# Patient Record
Sex: FEMALE | Race: WHITE | NOT HISPANIC OR LATINO | Employment: FULL TIME | URBAN - METROPOLITAN AREA
[De-identification: names, ages, dates, MRNs, and addresses within clinical notes are randomized per-mention and may not be internally consistent; named-entity substitution may affect disease eponyms.]

---

## 2023-02-11 PROBLEM — M19.91 PRIMARY OSTEOARTHRITIS: Status: ACTIVE | Noted: 2023-02-11

## 2023-02-11 PROBLEM — E55.9 VITAMIN D DEFICIENCY: Status: ACTIVE | Noted: 2023-02-11

## 2023-02-11 PROBLEM — K21.00 GASTROESOPHAGEAL REFLUX DISEASE WITH ESOPHAGITIS WITHOUT HEMORRHAGE: Status: ACTIVE | Noted: 2023-02-11

## 2023-02-11 PROBLEM — R73.09 ABNORMAL GLUCOSE: Status: ACTIVE | Noted: 2023-02-11

## 2023-02-11 PROBLEM — H61.21 CERUMEN DEBRIS ON TYMPANIC MEMBRANE OF RIGHT EAR: Status: ACTIVE | Noted: 2023-02-11

## 2023-02-11 PROBLEM — M19.012 ARTHRITIS OF LEFT SHOULDER REGION: Status: ACTIVE | Noted: 2023-02-11

## 2023-02-11 PROBLEM — Z78.0 MENOPAUSE: Status: ACTIVE | Noted: 2023-02-11

## 2023-02-11 PROBLEM — F33.9 CHRONIC MAJOR DEPRESSIVE DISORDER, RECURRENT EPISODE (CMS-HCC): Status: ACTIVE | Noted: 2023-02-11

## 2023-02-11 PROBLEM — E78.2 MIXED HYPERLIPIDEMIA: Status: ACTIVE | Noted: 2023-02-11

## 2023-02-11 PROBLEM — H61.23 BILATERAL IMPACTED CERUMEN: Status: ACTIVE | Noted: 2023-02-11

## 2023-02-11 PROBLEM — M85.80 OSTEOPENIA: Status: ACTIVE | Noted: 2023-02-11

## 2023-02-11 RX ORDER — ATORVASTATIN CALCIUM 40 MG/1
TABLET, FILM COATED ORAL
COMMUNITY
Start: 2013-08-26 | End: 2023-06-20 | Stop reason: SDUPTHER

## 2023-02-11 RX ORDER — PANTOPRAZOLE SODIUM 40 MG/1
TABLET, DELAYED RELEASE ORAL
COMMUNITY
Start: 2022-05-19 | End: 2023-03-27 | Stop reason: SDUPTHER

## 2023-02-11 RX ORDER — CITALOPRAM 40 MG/1
TABLET, FILM COATED ORAL
COMMUNITY
Start: 2013-08-26 | End: 2023-03-27 | Stop reason: SDUPTHER

## 2023-02-11 RX ORDER — CELECOXIB 200 MG/1
CAPSULE ORAL
COMMUNITY
Start: 2013-09-19 | End: 2023-03-27 | Stop reason: SDUPTHER

## 2023-02-11 RX ORDER — LEVOTHYROXINE SODIUM 25 UG/1
TABLET ORAL
COMMUNITY
End: 2023-04-20 | Stop reason: SDUPTHER

## 2023-03-16 ENCOUNTER — TELEPHONE (OUTPATIENT)
Dept: PRIMARY CARE | Facility: CLINIC | Age: 68
End: 2023-03-16
Payer: COMMERCIAL

## 2023-03-27 ENCOUNTER — OFFICE VISIT (OUTPATIENT)
Dept: PRIMARY CARE | Facility: CLINIC | Age: 68
End: 2023-03-27
Payer: COMMERCIAL

## 2023-03-27 VITALS
DIASTOLIC BLOOD PRESSURE: 68 MMHG | RESPIRATION RATE: 16 BRPM | HEIGHT: 67 IN | HEART RATE: 59 BPM | WEIGHT: 167 LBS | OXYGEN SATURATION: 99 % | BODY MASS INDEX: 26.21 KG/M2 | TEMPERATURE: 97.2 F | SYSTOLIC BLOOD PRESSURE: 110 MMHG

## 2023-03-27 DIAGNOSIS — E53.8 VITAMIN B 12 DEFICIENCY: ICD-10-CM

## 2023-03-27 DIAGNOSIS — E78.2 MIXED HYPERLIPIDEMIA: ICD-10-CM

## 2023-03-27 DIAGNOSIS — K21.00 GASTROESOPHAGEAL REFLUX DISEASE WITH ESOPHAGITIS WITHOUT HEMORRHAGE: ICD-10-CM

## 2023-03-27 DIAGNOSIS — Z12.12 ENCOUNTER FOR SCREENING FOR COLORECTAL MALIGNANT NEOPLASM: ICD-10-CM

## 2023-03-27 DIAGNOSIS — R73.09 ABNORMAL GLUCOSE: ICD-10-CM

## 2023-03-27 DIAGNOSIS — M19.012 ARTHRITIS OF LEFT SHOULDER REGION: ICD-10-CM

## 2023-03-27 DIAGNOSIS — E55.9 VITAMIN D DEFICIENCY: ICD-10-CM

## 2023-03-27 DIAGNOSIS — Z12.11 ENCOUNTER FOR SCREENING FOR COLORECTAL MALIGNANT NEOPLASM: ICD-10-CM

## 2023-03-27 DIAGNOSIS — M15.9 PRIMARY OSTEOARTHRITIS INVOLVING MULTIPLE JOINTS: ICD-10-CM

## 2023-03-27 DIAGNOSIS — F33.9 CHRONIC MAJOR DEPRESSIVE DISORDER, RECURRENT EPISODE (CMS-HCC): Primary | ICD-10-CM

## 2023-03-27 DIAGNOSIS — Z12.11 ENCOUNTER FOR SCREENING FOR MALIGNANT NEOPLASM OF COLON: ICD-10-CM

## 2023-03-27 DIAGNOSIS — R79.9 ABNORMAL FINDING OF BLOOD CHEMISTRY, UNSPECIFIED: ICD-10-CM

## 2023-03-27 DIAGNOSIS — M85.80 OSTEOPENIA, UNSPECIFIED LOCATION: ICD-10-CM

## 2023-03-27 DIAGNOSIS — H61.21 IMPACTED CERUMEN, RIGHT EAR: ICD-10-CM

## 2023-03-27 DIAGNOSIS — H61.20 WAX IN EAR: ICD-10-CM

## 2023-03-27 DIAGNOSIS — Z00.00 ROUTINE GENERAL MEDICAL EXAMINATION AT A HEALTH CARE FACILITY: ICD-10-CM

## 2023-03-27 PROBLEM — Z78.0 ENCOUNTER FOR OSTEOPOROSIS SCREENING IN ASYMPTOMATIC POSTMENOPAUSAL PATIENT: Status: ACTIVE | Noted: 2023-03-27

## 2023-03-27 PROBLEM — Z13.820 ENCOUNTER FOR OSTEOPOROSIS SCREENING IN ASYMPTOMATIC POSTMENOPAUSAL PATIENT: Status: ACTIVE | Noted: 2023-03-27

## 2023-03-27 LAB — POC HEMOGLOBIN A1C: 5.9 % (ref 4.2–6.5)

## 2023-03-27 PROCEDURE — 1036F TOBACCO NON-USER: CPT | Performed by: FAMILY MEDICINE

## 2023-03-27 PROCEDURE — 99214 OFFICE O/P EST MOD 30 MIN: CPT | Performed by: FAMILY MEDICINE

## 2023-03-27 PROCEDURE — 1159F MED LIST DOCD IN RCRD: CPT | Performed by: FAMILY MEDICINE

## 2023-03-27 PROCEDURE — 1160F RVW MEDS BY RX/DR IN RCRD: CPT | Performed by: FAMILY MEDICINE

## 2023-03-27 PROCEDURE — 83036 HEMOGLOBIN GLYCOSYLATED A1C: CPT | Performed by: FAMILY MEDICINE

## 2023-03-27 RX ORDER — CITALOPRAM 40 MG/1
40 TABLET, FILM COATED ORAL DAILY
Qty: 90 TABLET | Refills: 1 | Status: SHIPPED
Start: 2023-03-27 | End: 2023-06-20 | Stop reason: DRUGHIGH

## 2023-03-27 RX ORDER — VIT C/E/ZN/COPPR/LUTEIN/ZEAXAN 250MG-90MG
1000 CAPSULE ORAL
COMMUNITY

## 2023-03-27 RX ORDER — PANTOPRAZOLE SODIUM 40 MG/1
40 TABLET, DELAYED RELEASE ORAL
Qty: 180 TABLET | Refills: 0 | Status: SHIPPED | OUTPATIENT
Start: 2023-03-27 | End: 2023-06-20 | Stop reason: SDUPTHER

## 2023-03-27 RX ORDER — CELECOXIB 200 MG/1
200 CAPSULE ORAL 2 TIMES DAILY
Qty: 180 CAPSULE | Refills: 1 | Status: SHIPPED | OUTPATIENT
Start: 2023-03-27 | End: 2023-06-20 | Stop reason: SDUPTHER

## 2023-03-27 RX ORDER — CHOLECALCIFEROL (VITAMIN D3) 25 MCG
TABLET,CHEWABLE ORAL EVERY 24 HOURS
COMMUNITY

## 2023-03-27 ASSESSMENT — ENCOUNTER SYMPTOMS
CHILLS: 0
NERVOUS/ANXIOUS: 0
FACIAL SWELLING: 0
LOSS OF SENSATION IN FEET: 0
APPETITE CHANGE: 0
NAUSEA: 0
SLEEP DISTURBANCE: 0
WOUND: 0
ADENOPATHY: 0
NECK STIFFNESS: 0
DYSURIA: 0
VOMITING: 0
CONSTIPATION: 0
WHEEZING: 0
POLYDIPSIA: 0
JOINT SWELLING: 0
FEVER: 0
COLOR CHANGE: 0
SINUS PRESSURE: 0
UNEXPECTED WEIGHT CHANGE: 0
ARTHRALGIAS: 0
BACK PAIN: 0
POLYPHAGIA: 0
SHORTNESS OF BREATH: 0
TROUBLE SWALLOWING: 0
PALPITATIONS: 0
MYALGIAS: 0
OCCASIONAL FEELINGS OF UNSTEADINESS: 0
EYE ITCHING: 0
CHEST TIGHTNESS: 0
BRUISES/BLEEDS EASILY: 0
BLOOD IN STOOL: 0
EYE PAIN: 0
AGITATION: 0
RHINORRHEA: 0
SINUS PAIN: 0
VOICE CHANGE: 0
SORE THROAT: 0
COUGH: 0
ABDOMINAL PAIN: 0
FATIGUE: 0
EYE DISCHARGE: 0
DIZZINESS: 0
DIAPHORESIS: 0
DIARRHEA: 0
HEMATURIA: 0
ACTIVITY CHANGE: 0
FREQUENCY: 0
FLANK PAIN: 0
DEPRESSION: 0
EYE REDNESS: 0

## 2023-03-27 ASSESSMENT — PATIENT HEALTH QUESTIONNAIRE - PHQ9
2. FEELING DOWN, DEPRESSED OR HOPELESS: NOT AT ALL
1. LITTLE INTEREST OR PLEASURE IN DOING THINGS: NOT AT ALL
SUM OF ALL RESPONSES TO PHQ9 QUESTIONS 1 AND 2: 0

## 2023-03-27 NOTE — PROGRESS NOTES
"Subjective   Patient ID: Tammy Marks is a 68 y.o. female who presents for 6 month follow up  and Hypothyroidism (Needs to have A1c in office today).  Today she is accompanied by alone.     HPI    Review of Systems   Constitutional:  Negative for activity change, appetite change, chills, diaphoresis, fatigue, fever and unexpected weight change.   HENT:  Negative for congestion, dental problem, drooling, ear discharge, ear pain, facial swelling, hearing loss, nosebleeds, postnasal drip, rhinorrhea, sinus pressure, sinus pain, sneezing, sore throat, tinnitus, trouble swallowing and voice change.    Eyes:  Negative for pain, discharge, redness, itching and visual disturbance.   Respiratory:  Negative for cough, chest tightness, shortness of breath and wheezing.    Cardiovascular:  Negative for chest pain, palpitations and leg swelling.   Gastrointestinal:  Negative for abdominal pain, blood in stool, constipation, diarrhea, nausea and vomiting.   Endocrine: Negative for cold intolerance, heat intolerance, polydipsia, polyphagia and polyuria.   Genitourinary:  Negative for decreased urine volume, dysuria, flank pain, frequency, hematuria and urgency.   Musculoskeletal:  Negative for arthralgias, back pain, gait problem, joint swelling, myalgias and neck stiffness.   Skin:  Negative for color change, pallor, rash and wound.   Neurological:  Negative for dizziness.   Hematological:  Negative for adenopathy. Does not bruise/bleed easily.   Psychiatric/Behavioral:  Negative for agitation, behavioral problems and sleep disturbance. The patient is not nervous/anxious.        Objective   Pulse 59   Temp 36.2 °C (97.2 °F)   Resp 16   Ht 1.702 m (5' 7\")   Wt 75.8 kg (167 lb)   SpO2 99%   BMI 26.16 kg/m²   BSA: 1.89 meters squared  Growth percentiles: Facility age limit for growth %angel is 20 years. Facility age limit for growth %angel is 20 years.     Physical Exam  Vitals and nursing note reviewed.   Constitutional:     "   General: She is not in acute distress.     Appearance: Normal appearance. She is normal weight. She is not ill-appearing or toxic-appearing.   HENT:      Head: Normocephalic.      Right Ear: Tympanic membrane, ear canal and external ear normal. There is impacted cerumen.      Left Ear: Tympanic membrane, ear canal and external ear normal. There is no impacted cerumen.      Nose: Nose normal. No congestion or rhinorrhea.      Mouth/Throat:      Mouth: Mucous membranes are moist.      Pharynx: Oropharynx is clear. No oropharyngeal exudate or posterior oropharyngeal erythema.   Eyes:      General: No scleral icterus.        Right eye: No discharge.         Left eye: No discharge.      Extraocular Movements: Extraocular movements intact.      Conjunctiva/sclera: Conjunctivae normal.      Pupils: Pupils are equal, round, and reactive to light.   Neck:      Vascular: No carotid bruit.   Cardiovascular:      Rate and Rhythm: Normal rate and regular rhythm.      Pulses: Normal pulses.      Heart sounds: Normal heart sounds. No murmur heard.     No gallop.   Pulmonary:      Effort: No respiratory distress.      Breath sounds: No wheezing or rhonchi.   Chest:      Chest wall: No tenderness.   Abdominal:      General: Abdomen is flat. Bowel sounds are normal.      Palpations: Abdomen is soft. There is no mass.      Tenderness: There is no abdominal tenderness. There is no guarding or rebound.      Hernia: No hernia is present.   Musculoskeletal:         General: No swelling or tenderness. Normal range of motion.      Cervical back: Normal range of motion. No rigidity or tenderness.      Right lower leg: No edema.      Left lower leg: No edema.   Lymphadenopathy:      Cervical: No cervical adenopathy.   Skin:     General: Skin is warm and dry.      Coloration: Skin is not pale.      Findings: No bruising, erythema or rash.   Neurological:      General: No focal deficit present.      Mental Status: She is alert and oriented to  person, place, and time.      Sensory: No sensory deficit.      Coordination: Coordination normal.      Gait: Gait normal.      Deep Tendon Reflexes: Reflexes normal.   Psychiatric:         Mood and Affect: Mood normal.         Behavior: Behavior normal.         Thought Content: Thought content normal.         Judgment: Judgment normal.         Assessment/Plan   Problem List Items Addressed This Visit       Abnormal glucose    Relevant Orders    POCT glycosylated hemoglobin (Hb A1C) manually resulted    Arthritis of left shoulder region    Chronic major depressive disorder, recurrent episode (CMS/HCC) - Primary    Relevant Medications    citalopram (CeleXA) 40 mg tablet    Other Relevant Orders    TSH with reflex to Free T4 if abnormal    Gastroesophageal reflux disease with esophagitis without hemorrhage    Relevant Medications    pantoprazole (ProtoNix) 40 mg EC tablet    Mixed hyperlipidemia    Relevant Orders    Comprehensive Metabolic Panel    Osteopenia    Relevant Medications    celecoxib (CeleBREX) 200 mg capsule    Primary osteoarthritis    Vitamin D deficiency    Vitamin B 12 deficiency    Relevant Orders    Vitamin B12     Other Visit Diagnoses       Routine general medical examination at a health care facility        Relevant Orders    Lipid Panel    CBC and Auto Differential    Follow Up In [facility] - PCP    Encounter for screening for colorectal malignant neoplasm        Relevant Orders    POCT Fecal occult immunoassay-ifob manually resulted    Encounter for screening for malignant neoplasm of colon        Relevant Orders    POCT Fecal occult immunoassay-ifob manually resulted    Abnormal finding of blood chemistry, unspecified        Relevant Orders    CBC and Auto Differential    Wax in ear        Relevant Orders    Ear Cerumen Removal    Impacted cerumen, right ear        Relevant Orders    Ear Cerumen Removal

## 2023-04-20 DIAGNOSIS — R79.9 ABNORMAL FINDING OF BLOOD CHEMISTRY, UNSPECIFIED: ICD-10-CM

## 2023-04-20 RX ORDER — LEVOTHYROXINE SODIUM 25 UG/1
25 TABLET ORAL
Qty: 90 TABLET | Refills: 1 | Status: SHIPPED | OUTPATIENT
Start: 2023-04-20 | End: 2023-06-20 | Stop reason: SDUPTHER

## 2023-05-23 ENCOUNTER — LAB (OUTPATIENT)
Dept: LAB | Facility: LAB | Age: 68
End: 2023-05-23
Payer: COMMERCIAL

## 2023-05-23 DIAGNOSIS — R79.9 ABNORMAL FINDING OF BLOOD CHEMISTRY, UNSPECIFIED: ICD-10-CM

## 2023-05-23 DIAGNOSIS — E78.2 MIXED HYPERLIPIDEMIA: ICD-10-CM

## 2023-05-23 DIAGNOSIS — Z00.00 ROUTINE GENERAL MEDICAL EXAMINATION AT A HEALTH CARE FACILITY: ICD-10-CM

## 2023-05-23 DIAGNOSIS — F33.9 CHRONIC MAJOR DEPRESSIVE DISORDER, RECURRENT EPISODE (CMS-HCC): ICD-10-CM

## 2023-05-23 DIAGNOSIS — E53.8 VITAMIN B 12 DEFICIENCY: ICD-10-CM

## 2023-05-23 LAB
ALANINE AMINOTRANSFERASE (SGPT) (U/L) IN SER/PLAS: 22 U/L (ref 7–45)
ALBUMIN (G/DL) IN SER/PLAS: 4.3 G/DL (ref 3.4–5)
ALKALINE PHOSPHATASE (U/L) IN SER/PLAS: 93 U/L (ref 33–136)
ANION GAP IN SER/PLAS: 9 MMOL/L (ref 10–20)
ASPARTATE AMINOTRANSFERASE (SGOT) (U/L) IN SER/PLAS: 18 U/L (ref 9–39)
BASOPHILS (10*3/UL) IN BLOOD BY AUTOMATED COUNT: 0.03 X10E9/L (ref 0–0.1)
BASOPHILS/100 LEUKOCYTES IN BLOOD BY AUTOMATED COUNT: 0.6 % (ref 0–2)
BILIRUBIN TOTAL (MG/DL) IN SER/PLAS: 0.6 MG/DL (ref 0–1.2)
CALCIUM (MG/DL) IN SER/PLAS: 9.4 MG/DL (ref 8.6–10.6)
CARBON DIOXIDE, TOTAL (MMOL/L) IN SER/PLAS: 32 MMOL/L (ref 21–32)
CHLORIDE (MMOL/L) IN SER/PLAS: 107 MMOL/L (ref 98–107)
CHOLESTEROL (MG/DL) IN SER/PLAS: 193 MG/DL (ref 0–199)
CHOLESTEROL IN HDL (MG/DL) IN SER/PLAS: 66.2 MG/DL
CHOLESTEROL/HDL RATIO: 2.9
COBALAMIN (VITAMIN B12) (PG/ML) IN SER/PLAS: 929 PG/ML (ref 211–911)
CREATININE (MG/DL) IN SER/PLAS: 0.67 MG/DL (ref 0.5–1.05)
EOSINOPHILS (10*3/UL) IN BLOOD BY AUTOMATED COUNT: 0.16 X10E9/L (ref 0–0.7)
EOSINOPHILS/100 LEUKOCYTES IN BLOOD BY AUTOMATED COUNT: 3.4 % (ref 0–6)
ERYTHROCYTE DISTRIBUTION WIDTH (RATIO) BY AUTOMATED COUNT: 12.7 % (ref 11.5–14.5)
ERYTHROCYTE MEAN CORPUSCULAR HEMOGLOBIN CONCENTRATION (G/DL) BY AUTOMATED: 32.7 G/DL (ref 32–36)
ERYTHROCYTE MEAN CORPUSCULAR VOLUME (FL) BY AUTOMATED COUNT: 100 FL (ref 80–100)
ERYTHROCYTES (10*6/UL) IN BLOOD BY AUTOMATED COUNT: 4.27 X10E12/L (ref 4–5.2)
GFR FEMALE: >90 ML/MIN/1.73M2
GLUCOSE (MG/DL) IN SER/PLAS: 105 MG/DL (ref 74–99)
HEMATOCRIT (%) IN BLOOD BY AUTOMATED COUNT: 42.8 % (ref 36–46)
HEMOGLOBIN (G/DL) IN BLOOD: 14 G/DL (ref 12–16)
IMMATURE GRANULOCYTES/100 LEUKOCYTES IN BLOOD BY AUTOMATED COUNT: 0.4 % (ref 0–0.9)
LDL: 100 MG/DL (ref 0–99)
LEUKOCYTES (10*3/UL) IN BLOOD BY AUTOMATED COUNT: 4.7 X10E9/L (ref 4.4–11.3)
LYMPHOCYTES (10*3/UL) IN BLOOD BY AUTOMATED COUNT: 1.6 X10E9/L (ref 1.2–4.8)
LYMPHOCYTES/100 LEUKOCYTES IN BLOOD BY AUTOMATED COUNT: 34 % (ref 13–44)
MONOCYTES (10*3/UL) IN BLOOD BY AUTOMATED COUNT: 0.35 X10E9/L (ref 0.1–1)
MONOCYTES/100 LEUKOCYTES IN BLOOD BY AUTOMATED COUNT: 7.4 % (ref 2–10)
NEUTROPHILS (10*3/UL) IN BLOOD BY AUTOMATED COUNT: 2.54 X10E9/L (ref 1.2–7.7)
NEUTROPHILS/100 LEUKOCYTES IN BLOOD BY AUTOMATED COUNT: 54.2 % (ref 40–80)
NRBC (PER 100 WBCS) BY AUTOMATED COUNT: 0 /100 WBC (ref 0–0)
PLATELETS (10*3/UL) IN BLOOD AUTOMATED COUNT: 250 X10E9/L (ref 150–450)
POTASSIUM (MMOL/L) IN SER/PLAS: 4.4 MMOL/L (ref 3.5–5.3)
PROTEIN TOTAL: 6.4 G/DL (ref 6.4–8.2)
SODIUM (MMOL/L) IN SER/PLAS: 144 MMOL/L (ref 136–145)
THYROTROPIN (MIU/L) IN SER/PLAS BY DETECTION LIMIT <= 0.05 MIU/L: 2.86 MIU/L (ref 0.44–3.98)
TRIGLYCERIDE (MG/DL) IN SER/PLAS: 134 MG/DL (ref 0–149)
UREA NITROGEN (MG/DL) IN SER/PLAS: 15 MG/DL (ref 6–23)
VLDL: 27 MG/DL (ref 0–40)

## 2023-05-23 PROCEDURE — 82607 VITAMIN B-12: CPT

## 2023-05-23 PROCEDURE — 85025 COMPLETE CBC W/AUTO DIFF WBC: CPT

## 2023-05-23 PROCEDURE — 80053 COMPREHEN METABOLIC PANEL: CPT

## 2023-05-23 PROCEDURE — 84443 ASSAY THYROID STIM HORMONE: CPT

## 2023-05-23 PROCEDURE — 80061 LIPID PANEL: CPT

## 2023-05-23 PROCEDURE — 36415 COLL VENOUS BLD VENIPUNCTURE: CPT

## 2023-06-20 ENCOUNTER — OFFICE VISIT (OUTPATIENT)
Dept: PRIMARY CARE | Facility: CLINIC | Age: 68
End: 2023-06-20
Payer: COMMERCIAL

## 2023-06-20 VITALS
OXYGEN SATURATION: 99 % | DIASTOLIC BLOOD PRESSURE: 78 MMHG | WEIGHT: 168 LBS | RESPIRATION RATE: 16 BRPM | TEMPERATURE: 97.2 F | HEART RATE: 76 BPM | SYSTOLIC BLOOD PRESSURE: 118 MMHG | BODY MASS INDEX: 26.37 KG/M2 | HEIGHT: 67 IN

## 2023-06-20 DIAGNOSIS — Z12.31 ENCOUNTER FOR SCREENING MAMMOGRAM FOR MALIGNANT NEOPLASM OF BREAST: ICD-10-CM

## 2023-06-20 DIAGNOSIS — K21.00 GASTROESOPHAGEAL REFLUX DISEASE WITH ESOPHAGITIS WITHOUT HEMORRHAGE: ICD-10-CM

## 2023-06-20 DIAGNOSIS — Z00.00 ROUTINE GENERAL MEDICAL EXAMINATION AT HEALTH CARE FACILITY: Primary | ICD-10-CM

## 2023-06-20 DIAGNOSIS — Z71.89 CARDIAC RISK COUNSELING: ICD-10-CM

## 2023-06-20 DIAGNOSIS — Z78.0 ENCOUNTER FOR OSTEOPOROSIS SCREENING IN ASYMPTOMATIC POSTMENOPAUSAL PATIENT: ICD-10-CM

## 2023-06-20 DIAGNOSIS — R73.09 ABNORMAL GLUCOSE: ICD-10-CM

## 2023-06-20 DIAGNOSIS — F33.9 CHRONIC MAJOR DEPRESSIVE DISORDER, RECURRENT EPISODE (CMS-HCC): ICD-10-CM

## 2023-06-20 DIAGNOSIS — E03.9 ACQUIRED HYPOTHYROIDISM: ICD-10-CM

## 2023-06-20 DIAGNOSIS — Z13.6 SCREENING FOR CARDIOVASCULAR CONDITION: ICD-10-CM

## 2023-06-20 DIAGNOSIS — Z13.1 DIABETES MELLITUS SCREENING: ICD-10-CM

## 2023-06-20 DIAGNOSIS — E78.2 MIXED HYPERLIPIDEMIA: ICD-10-CM

## 2023-06-20 DIAGNOSIS — Z13.820 ENCOUNTER FOR OSTEOPOROSIS SCREENING IN ASYMPTOMATIC POSTMENOPAUSAL PATIENT: ICD-10-CM

## 2023-06-20 DIAGNOSIS — Z12.11 SCREENING FOR COLORECTAL CANCER: ICD-10-CM

## 2023-06-20 DIAGNOSIS — M85.80 OSTEOPENIA, UNSPECIFIED LOCATION: ICD-10-CM

## 2023-06-20 DIAGNOSIS — Z12.12 SCREENING FOR COLORECTAL CANCER: ICD-10-CM

## 2023-06-20 LAB — POC HEMOGLOBIN A1C: 5.7 % (ref 4.2–6.5)

## 2023-06-20 PROCEDURE — G0446 INTENS BEHAVE THER CARDIO DX: HCPCS | Performed by: FAMILY MEDICINE

## 2023-06-20 PROCEDURE — 99214 OFFICE O/P EST MOD 30 MIN: CPT | Performed by: FAMILY MEDICINE

## 2023-06-20 PROCEDURE — 1160F RVW MEDS BY RX/DR IN RCRD: CPT | Performed by: FAMILY MEDICINE

## 2023-06-20 PROCEDURE — 99497 ADVNCD CARE PLAN 30 MIN: CPT | Performed by: FAMILY MEDICINE

## 2023-06-20 PROCEDURE — G0439 PPPS, SUBSEQ VISIT: HCPCS | Performed by: FAMILY MEDICINE

## 2023-06-20 PROCEDURE — 1036F TOBACCO NON-USER: CPT | Performed by: FAMILY MEDICINE

## 2023-06-20 PROCEDURE — 99397 PER PM REEVAL EST PAT 65+ YR: CPT | Performed by: FAMILY MEDICINE

## 2023-06-20 PROCEDURE — 1033F TOBACCO NONSMOKER NOR 2NDHND: CPT | Performed by: FAMILY MEDICINE

## 2023-06-20 PROCEDURE — 83036 HEMOGLOBIN GLYCOSYLATED A1C: CPT | Performed by: FAMILY MEDICINE

## 2023-06-20 PROCEDURE — G0444 DEPRESSION SCREEN ANNUAL: HCPCS | Performed by: FAMILY MEDICINE

## 2023-06-20 PROCEDURE — 1159F MED LIST DOCD IN RCRD: CPT | Performed by: FAMILY MEDICINE

## 2023-06-20 PROCEDURE — 1170F FXNL STATUS ASSESSED: CPT | Performed by: FAMILY MEDICINE

## 2023-06-20 PROCEDURE — 93000 ELECTROCARDIOGRAM COMPLETE: CPT | Performed by: FAMILY MEDICINE

## 2023-06-20 RX ORDER — ATORVASTATIN CALCIUM 40 MG/1
40 TABLET, FILM COATED ORAL DAILY
Qty: 90 TABLET | Refills: 0 | Status: SHIPPED | OUTPATIENT
Start: 2023-06-20 | End: 2023-06-23 | Stop reason: SDUPTHER

## 2023-06-20 RX ORDER — PANTOPRAZOLE SODIUM 40 MG/1
40 TABLET, DELAYED RELEASE ORAL
Qty: 180 TABLET | Refills: 1 | Status: SHIPPED | OUTPATIENT
Start: 2023-06-20 | End: 2024-02-12 | Stop reason: SDUPTHER

## 2023-06-20 RX ORDER — CELECOXIB 200 MG/1
200 CAPSULE ORAL 2 TIMES DAILY
Qty: 180 CAPSULE | Refills: 1 | Status: SHIPPED | OUTPATIENT
Start: 2023-06-20 | End: 2024-02-12 | Stop reason: SDUPTHER

## 2023-06-20 RX ORDER — LEVOTHYROXINE SODIUM 25 UG/1
25 TABLET ORAL
Qty: 90 TABLET | Refills: 1 | Status: SHIPPED | OUTPATIENT
Start: 2023-06-20 | End: 2024-01-17

## 2023-06-20 RX ORDER — CITALOPRAM 20 MG/1
30 TABLET, FILM COATED ORAL DAILY
Qty: 45 TABLET | Refills: 1 | Status: SHIPPED | OUTPATIENT
Start: 2023-06-20 | End: 2023-09-20 | Stop reason: SDUPTHER

## 2023-06-20 ASSESSMENT — ENCOUNTER SYMPTOMS
FATIGUE: 0
TROUBLE SWALLOWING: 0
COLOR CHANGE: 0
FREQUENCY: 0
NECK STIFFNESS: 0
WOUND: 0
DIARRHEA: 0
CONSTIPATION: 0
ABDOMINAL PAIN: 0
COUGH: 0
APPETITE CHANGE: 0
POLYDIPSIA: 0
BRUISES/BLEEDS EASILY: 0
AGITATION: 0
FEVER: 0
VOICE CHANGE: 0
BLOOD IN STOOL: 0
SINUS PAIN: 0
DIAPHORESIS: 0
ARTHRALGIAS: 0
DEPRESSION: 0
BACK PAIN: 0
JOINT SWELLING: 0
EYE REDNESS: 0
DYSURIA: 0
FACIAL SWELLING: 0
MYALGIAS: 0
SINUS PRESSURE: 0
PALPITATIONS: 0
LOSS OF SENSATION IN FEET: 0
FLANK PAIN: 0
POLYPHAGIA: 0
EYE DISCHARGE: 0
SHORTNESS OF BREATH: 0
EYE PAIN: 0
CHILLS: 0
RHINORRHEA: 0
HEMATURIA: 0
WHEEZING: 0
NAUSEA: 0
EYE ITCHING: 0
CHEST TIGHTNESS: 0
DIZZINESS: 0
SORE THROAT: 0
UNEXPECTED WEIGHT CHANGE: 0
ADENOPATHY: 0
NERVOUS/ANXIOUS: 0
SLEEP DISTURBANCE: 0
VOMITING: 0
OCCASIONAL FEELINGS OF UNSTEADINESS: 0
ACTIVITY CHANGE: 0

## 2023-06-20 ASSESSMENT — ACTIVITIES OF DAILY LIVING (ADL)
DRESSING: INDEPENDENT
TAKING_MEDICATION: INDEPENDENT
DOING_HOUSEWORK: INDEPENDENT
BATHING: INDEPENDENT
GROCERY_SHOPPING: INDEPENDENT
MANAGING_FINANCES: INDEPENDENT

## 2023-06-20 ASSESSMENT — PATIENT HEALTH QUESTIONNAIRE - PHQ9
SUM OF ALL RESPONSES TO PHQ9 QUESTIONS 1 AND 2: 0
2. FEELING DOWN, DEPRESSED OR HOPELESS: NOT AT ALL
1. LITTLE INTEREST OR PLEASURE IN DOING THINGS: NOT AT ALL
10. IF YOU CHECKED OFF ANY PROBLEMS, HOW DIFFICULT HAVE THESE PROBLEMS MADE IT FOR YOU TO DO YOUR WORK, TAKE CARE OF THINGS AT HOME, OR GET ALONG WITH OTHER PEOPLE: NOT DIFFICULT AT ALL

## 2023-06-20 NOTE — PATIENT INSTRUCTIONS
F/up in 3 months for depression    Depression      What is depression?;  When doctors talk about depression, they mean the medical illness called major depression. Someone who has major depression has symptoms nearly every day, all day, for 2 weeks or longer. There is also a minor form of depression that causes less severe symptoms. Both kinds of depression have the same causes and treatment.;  Depression can affect people of all ages and is different for every person. A person who has depression can't control his or her feelings. If you or your child, teen, or older relative is depressed, it's not his or her fault.     Women are twice as likely as men to experience depression. The reason for this is unknown, but changes in a woman's hormone levels may be related to depression.     What are the symptoms of depression?;  The symptoms of depression are different for every person. You may have one or many of the symptoms listed below. Your symptoms may include only emotional or only physical symptoms, or both.;     Emotional symptoms  Crying easily or for no reason ;  Feeling guilty or worthless ;  Feeling restless, irritated, and easily annoyed ;  Feeling sad, numb, or hopeless ;  Losing interest or pleasure in things you used to enjoy (including sex) ;  Thinking about death or suicide;     Physical symptoms  Changes in appetite (eating more than usual, or eating less than usual) ;  Feeling very tired all the time ;  Having other aches and pains that don't get better with treatment ;  Having trouble paying attention, recalling things, concentrating, and making decisions ;  Headaches, backaches, or digestive problems ;  Sleeping too much, or having problems sleeping ;  Unintended weight loss or gain;  The symptoms of depression may be different for children, teens, and seniors.;     What causes depression?  Depression may be caused by an imbalance of chemicals in the brain. Sometimes there aren't enough chemical  "messengers (called neurotransmitters) in the brain. Examples of neurotransmitters that affect your mood are serotonin (say: \"sair-a-tone-in\"), norepinephrine (say: \"nor-ep-in-ef-rin\"), and dopamine (say: \"dope-a-mean\"). A chemical imbalance in the brain may be caused by one or more of the following:;  Your genes. Sometimes depression is hereditary, meaning it runs in your family. If you have a parent or sibling who has depression, you may be more at risk for having depression yourself. ;  A medical condition. Problems with your thyroid, nutrient deficiencies, or chronic diseases such as heart disease, diabetes, or cancer may cause depression. ;  Events in your life. Depression can be triggered by stressful events in your life, such as the death of someone you love, a divorce, chronic illness, or loss of a job.   Medicines, drugs, or alcohol. Taking certain medicines, abusing drugs or alcohol, or having other illnesses can also lead to depression.;     Depression is not caused by personal weakness, laziness, or lack of willpower.;     Can giving birth cause depression?;  In the days following the birth of a baby, it is common for some mothers to have mood swings. They may feel a little depressed, have a hard time concentrating, lose their appetite, or find that they can't sleep well even when the baby is asleep. This is called the baby blues and goes away within 10 days after delivery. However, some women have worse symptoms or symptoms that last longer. This is called postpartum depression.     How is depression treated?  Depression can be treated with medicines, with counseling <http://[email or web address]/familydoctor/en/prevention-wellness/emotional-wellbeing/mental-health/therapy-and-counseling.html>, or with both. A nutritious diet, exercising on a regular basis, and avoiding alcohol, drugs, and too much caffeine can also help.;     How long will the depression last?  This depends on how soon you get help. Left " untreated, depression can last for weeks, months, or even years. The main risk in not getting treatment is suicide. Treatment can help depression lift in 8 to 12 weeks or less.;     What medicines are used to treat depression?;  Medicines that treat depression are called antidepressants. They help increase the number of chemical messengers (serotonin, norepinephrine, dopamine) in your brain.  Antidepressants work differently for different people. They also have different side effects. So, even if one medicine bothers you or doesn't work for you, another may help. You may notice improvement as soon as 1 week after you start taking the medicine. But you probably won't see the full effects for about 8 to 12 weeks. You may have side effects at first, but they tend to decrease after a couple of weeks. Don't stop taking the medicine without checking with your doctor first.;     How does counseling help?;   A combination of medicine and talk therapy is usually the most effective way of treating more severe depression. If you continue the combination treatment for at least a year, you are less likely to have depression come back.;you talk with a trained therapist or counselor about things that are going on in your life. The focus may be on your thoughts and beliefs, on things that happened in your past, or on your relationships. Or the focus may be on your behavior, how it's affecting you, and what you can do differently.      Tips on getting through depression  DO:  Pace yourself.   Get involved in activities that make you feel good or feel like you've achieved something.   Avoid drugs and alcohol. Both make depression worse. Both can cause dangerous side effects with antidepressant medicines.   Exercise often to make yourself feel better. Physical activity seems to cause a chemical reaction in the body that can improve your mood. Exercising 4 to 6 times a week for at least 30 minutes each time is a good goal. But even  "less activity can be helpful.   Eat balanced meals and healthful foods.   Get enough sleep.   Take your medicine and/or go to counseling as often as your doctor recommends. Your medicine won't work if you only take it once in a while.   Set small goals for yourself, because you may have less energy.   Encourage yourself.   Get as much information as you can about depression and how to treat it.   Call your doctor or the local suicide crisis center right away if you start thinking about suicide.  DON'T:  Don't isolate yourself. Stay in touch with your loved ones and friends, your [employer] advisor, and your family doctor.   Don't believe negative thoughts you may have, such as blaming yourself or expecting to fail. This thinking is part of depression. These thoughts will go away as your depression lifts.   Don't blame yourself for your depression. You didn't cause it.   Don't make major life decisions (for example, about separation or divorce). You may not be thinking clearly while you are depressed, so the decisions you make at this time may not be the best ones for you. If you must make a big decision, ask someone you trust to help you.   Don't expect to do everything you normally can. Set a realistic schedule.   Don't get discouraged. It will take time for your depression to lift fully. Be patient with yourself.   Don't give up.     READ: \"The Mindfulness and Acceptance Workbook for Depression\" by Janis  \"The Mindful Way Through Depression\" by Alberto Jerez Segal, and [place]        Suicide;  People who have depression sometimes think about suicide. This thinking is a common part of the depression. If you have thoughts about hurting yourself, tell someone. You could tell your doctor, your friends, your family, or call your local suicide hotline, such as the National Suicide Prevention Lifeline at 1-760.724.7163.;    "

## 2023-06-20 NOTE — PROGRESS NOTES
Subjective   Reason for Visit: Tammy Marks is an 68 y.o. female here for a Medicare Wellness visit.     Past Medical, Surgical, and Family History reviewed and updated in chart.    Reviewed all medications by prescribing practitioner or clinical pharmacist (such as prescriptions, OTCs, herbal therapies and supplements) and documented in the medical record.    HPI  Advance Care Planning Note     Discussion Date: 06/20/23   Discussion Participants: patient    The patient wishes to discuss Advance Care Planning today and the following is a brief summary of our discussion.     Patient has capacity to make their own medical decisions: Yes  Health Care Agent/Surrogate Decision Maker documented in chart: Yes    Documents on file and valid:  Advance Directive/Living Will: Yes   Health Care Power of : Yes  DNR: discussed and not a DNR      Communication of Medical Status/Pr  ognosis:   good     Communication of Treatment Goals/Options:   good     Treatment Decisions  good    Time Statement: Total face to face time spent on advance care planning was 16 minutes with 16 minutes spent in counseling, including the explanation.    Sonia Wiley MD    Patient Care Team:  Sonia Wiley MD as PCP - General  Sonia Wiley MD as PCP - [facility] PCP   Immunization History   Administered Date(s) Administered    Influenza, seasonal, injectable 10/19/2017, 10/29/2018, 10/04/2019, 09/23/2020, 10/15/2021    Moderna SARS-CoV-2 Vaccination 03/04/2021, 04/01/2021, 11/29/2021    Moderna Sars-cov-2 Bivalent Booster 04/18/2023    Novel influenza-H1N1-09, preservative-free 10/30/2009    Pneumococcal Conjugate PCV 13 07/20/2020, 07/20/2020    Pneumococcal Polysaccharide PPSV23 08/17/2021    Pneumococcal, Unspecified 07/20/2020    Td (adult), 5 Lf tetanus toxoid, preservative free, adsorbed 01/23/2020    Tdap 12/28/2009, 01/23/2020    Zoster, Recombinant 11/10/2021, 08/11/2022       Review of Systems  "  Constitutional:  Negative for activity change, appetite change, chills, diaphoresis, fatigue, fever and unexpected weight change.   HENT:  Negative for congestion, dental problem, drooling, ear discharge, ear pain, facial swelling, hearing loss, nosebleeds, postnasal drip, rhinorrhea, sinus pressure, sinus pain, sneezing, sore throat, tinnitus, trouble swallowing and voice change.    Eyes:  Negative for pain, discharge, redness, itching and visual disturbance.   Respiratory:  Negative for cough, chest tightness, shortness of breath and wheezing.    Cardiovascular:  Negative for chest pain, palpitations and leg swelling.   Gastrointestinal:  Negative for abdominal pain, blood in stool, constipation, diarrhea, nausea and vomiting.   Endocrine: Negative for cold intolerance, heat intolerance, polydipsia, polyphagia and polyuria.   Genitourinary:  Negative for decreased urine volume, dysuria, flank pain, frequency, hematuria and urgency.   Musculoskeletal:  Negative for arthralgias, back pain, gait problem, joint swelling, myalgias and neck stiffness.   Skin:  Negative for color change, pallor, rash and wound.   Neurological:  Negative for dizziness.   Hematological:  Negative for adenopathy. Does not bruise/bleed easily.   Psychiatric/Behavioral:  Negative for agitation, behavioral problems and sleep disturbance. The patient is not nervous/anxious.        Objective   Vitals:  Blood Pressure 118/78   Pulse 76   Temperature 36.2 °C (97.2 °F)   Respiration 16   Height 1.708 m (5' 7.25\")   Weight 76.2 kg (168 lb)   Oxygen Saturation 99%   Body Mass Index 26.12 kg/m²       Physical Exam  Vitals and nursing note reviewed.   Constitutional:       Appearance: Normal appearance.   HENT:      Head: Normocephalic.      Right Ear: Tympanic membrane normal.      Left Ear: Tympanic membrane normal.   Cardiovascular:      Rate and Rhythm: Normal rate and regular rhythm.      Pulses: Normal pulses.      Heart sounds: Normal " heart sounds.   Abdominal:      General: Abdomen is flat. Bowel sounds are normal.   Musculoskeletal:         General: Normal range of motion.   Neurological:      Mental Status: She is alert.   Psychiatric:         Mood and Affect: Mood normal.         Behavior: Behavior normal.         Assessment/Plan   Problem List Items Addressed This Visit       Abnormal glucose    Relevant Orders    3 Month Follow Up In [facility] - PCP    Chronic major depressive disorder, recurrent episode (CMS/HCC)    Relevant Medications    citalopram (CeleXA) 20 mg tablet    Gastroesophageal reflux disease with esophagitis without hemorrhage    Relevant Medications    pantoprazole (ProtoNix) 40 mg EC tablet    Mixed hyperlipidemia    Relevant Medications    atorvastatin (Lipitor) 40 mg tablet    Osteopenia    Relevant Medications    celecoxib (CeleBREX) 200 mg capsule    Encounter for osteoporosis screening in asymptomatic postmenopausal patient     Other Visit Diagnoses       Routine general medical examination at health care facility    -  Primary    Acquired hypothyroidism        Relevant Medications    levothyroxine (Synthroid, Levoxyl) 25 mcg tablet    Other Relevant Orders    3 Month Follow Up In [facility] - PCP    Encounter for screening mammogram for malignant neoplasm of breast        Relevant Orders    BI mammo bilateral screening tomosynthesis    Diabetes mellitus screening        Screening for cardiovascular condition        Relevant Orders    ECG 12 lead    Cardiac risk counseling        Screening for colorectal cancer        Relevant Orders    Fecal Occult Bld Immunoassay        The 10-year ASCVD risk score (Jennifer BOGGS, et al., 2019) is: 6.2%    Values used to calculate the score:      Age: 68 years      Sex: Female      Is Non- : No      Diabetic: No      Tobacco smoker: No      Systolic Blood Pressure: 118 mmHg      Is BP treated: No      HDL Cholesterol: 66.2 mg/dL      Total  Cholesterol: 193 mg/dL  Current Outpatient Medications   Medication Sig Dispense Refill    cholecalciferol (Vitamin D-3) 25 MCG (1000 UT) capsule Take 1 capsule (25 mcg) by mouth once daily.      cyanocobalamin, vitamin B-12, 3,000 mcg capsule once every 24 hours.      atorvastatin (Lipitor) 40 mg tablet Take 1 tablet (40 mg) by mouth once daily. 90 tablet 0    celecoxib (CeleBREX) 200 mg capsule Take 1 capsule (200 mg) by mouth 2 times a day. 180 capsule 1    citalopram (CeleXA) 20 mg tablet Take 1.5 tablets (30 mg) by mouth once daily. 45 tablet 1    levothyroxine (Synthroid, Levoxyl) 25 mcg tablet Take 1 tablet (25 mcg) by mouth once daily in the morning. Take before meals. TAKE 1 TABLET DAILY 90 tablet 1    pantoprazole (ProtoNix) 40 mg EC tablet Take 1 tablet (40 mg) by mouth 2 times a day before meals. take before breakfast and dinner 180 tablet 1     No current facility-administered medications for this visit.       F/up in 3 months for depression

## 2023-06-22 NOTE — SIGNIFICANT EVENT
06/20/23 1010   Functional Ability/Level of Safety   Cognitive Impairment Observed No cognitive impairment observed   Cognitive Impairment Reported No cognitive impairment reported by patient or family

## 2023-06-23 DIAGNOSIS — E78.2 MIXED HYPERLIPIDEMIA: ICD-10-CM

## 2023-06-23 NOTE — TELEPHONE ENCOUNTER
Maria Luisa calling from [facility] to request a refill:    Atorvastatin  40 MG  1qd  #90  Refill: 0              [facility] # [phone]

## 2023-06-24 RX ORDER — ATORVASTATIN CALCIUM 40 MG/1
40 TABLET, FILM COATED ORAL DAILY
Qty: 90 TABLET | Refills: 0 | Status: SHIPPED | OUTPATIENT
Start: 2023-06-24 | End: 2023-09-27 | Stop reason: SDUPTHER

## 2023-09-20 ENCOUNTER — OFFICE VISIT (OUTPATIENT)
Dept: PRIMARY CARE | Facility: CLINIC | Age: 68
End: 2023-09-20
Payer: COMMERCIAL

## 2023-09-20 VITALS
BODY MASS INDEX: 26.74 KG/M2 | HEART RATE: 69 BPM | RESPIRATION RATE: 16 BRPM | SYSTOLIC BLOOD PRESSURE: 118 MMHG | DIASTOLIC BLOOD PRESSURE: 76 MMHG | TEMPERATURE: 97.7 F | WEIGHT: 172 LBS

## 2023-09-20 DIAGNOSIS — Z23 NEED FOR IMMUNIZATION AGAINST INFLUENZA: ICD-10-CM

## 2023-09-20 DIAGNOSIS — F33.9 CHRONIC MAJOR DEPRESSIVE DISORDER, RECURRENT EPISODE (CMS-HCC): Primary | ICD-10-CM

## 2023-09-20 DIAGNOSIS — H61.23 BILATERAL IMPACTED CERUMEN: ICD-10-CM

## 2023-09-20 PROCEDURE — G0008 ADMIN INFLUENZA VIRUS VAC: HCPCS | Performed by: FAMILY MEDICINE

## 2023-09-20 PROCEDURE — 1160F RVW MEDS BY RX/DR IN RCRD: CPT | Performed by: FAMILY MEDICINE

## 2023-09-20 PROCEDURE — 99214 OFFICE O/P EST MOD 30 MIN: CPT | Performed by: FAMILY MEDICINE

## 2023-09-20 PROCEDURE — 1159F MED LIST DOCD IN RCRD: CPT | Performed by: FAMILY MEDICINE

## 2023-09-20 PROCEDURE — 90662 IIV NO PRSV INCREASED AG IM: CPT | Performed by: FAMILY MEDICINE

## 2023-09-20 PROCEDURE — 69210 REMOVE IMPACTED EAR WAX UNI: CPT | Performed by: FAMILY MEDICINE

## 2023-09-20 PROCEDURE — 1036F TOBACCO NON-USER: CPT | Performed by: FAMILY MEDICINE

## 2023-09-20 RX ORDER — CITALOPRAM 20 MG/1
30 TABLET, FILM COATED ORAL DAILY
Qty: 135 TABLET | Refills: 1 | Status: SHIPPED | OUTPATIENT
Start: 2023-09-20 | End: 2024-02-12 | Stop reason: SDUPTHER

## 2023-09-20 ASSESSMENT — ENCOUNTER SYMPTOMS
FLANK PAIN: 0
MYALGIAS: 0
EYE ITCHING: 0
APPETITE CHANGE: 0
ABDOMINAL PAIN: 0
INSOMNIA: 0
SLEEP QUALITY: GOOD
FEVER: 0
SINUS PRESSURE: 0
BACK PAIN: 0
TROUBLE SWALLOWING: 0
JOINT SWELLING: 0
DIZZINESS: 0
PALPITATIONS: 0
NAUSEA: 0
SORE THROAT: 0
ANHEDONIA: 0
FEELINGS OF WORTHLESSNESS: 0
HEMATURIA: 0
PANIC: 0
VOICE CHANGE: 0
EYE PAIN: 0
FREQUENCY: 0
COLOR CHANGE: 0
CONSTIPATION: 0
SHORTNESS OF BREATH: 0
UNEXPECTED WEIGHT CHANGE: 0
LOSS OF SENSATION IN FEET: 0
NECK STIFFNESS: 0
CHEST TIGHTNESS: 0
FATIGUE: 0
ARTHRALGIAS: 0
BLOOD IN STOOL: 0
COUGH: 0
POLYDIPSIA: 0
HOPELESSNESS: 0
BRUISES/BLEEDS EASILY: 0
RHINORRHEA: 0
DYSURIA: 0
WOUND: 0
VOMITING: 0
DEPRESSION: 0
WHEEZING: 0
ACTIVITY CHANGE: 0
ADENOPATHY: 0
POLYPHAGIA: 0
DIARRHEA: 0
DEPRESSION: 1
SLEEP DISTURBANCE: 0
FACIAL SWELLING: 0
NERVOUS/ANXIOUS: 0
DIAPHORESIS: 0
EYE REDNESS: 0
AGITATION: 0
OCCASIONAL FEELINGS OF UNSTEADINESS: 0
CHILLS: 0
SINUS PAIN: 0
EYE DISCHARGE: 0

## 2023-09-20 ASSESSMENT — PATIENT HEALTH QUESTIONNAIRE - PHQ9
1. LITTLE INTEREST OR PLEASURE IN DOING THINGS: NOT AT ALL
10. IF YOU CHECKED OFF ANY PROBLEMS, HOW DIFFICULT HAVE THESE PROBLEMS MADE IT FOR YOU TO DO YOUR WORK, TAKE CARE OF THINGS AT HOME, OR GET ALONG WITH OTHER PEOPLE: NOT DIFFICULT AT ALL
SUM OF ALL RESPONSES TO PHQ9 QUESTIONS 1 AND 2: 0
2. FEELING DOWN, DEPRESSED OR HOPELESS: NOT AT ALL

## 2023-09-20 NOTE — PATIENT INSTRUCTIONS
F/U in 4 months for MW visit  Ears cleaned today  Refills sent in to pharmacy    Depression      What is depression?;  When doctors talk about depression, they mean the medical illness called major depression. Someone who has major depression has symptoms nearly every day, all day, for 2 weeks or longer. There is also a minor form of depression that causes less severe symptoms. Both kinds of depression have the same causes and treatment.;  Depression can affect people of all ages and is different for every person. A person who has depression can't control his or her feelings. If you or your child, teen, or older relative is depressed, it's not his or her fault.     Women are twice as likely as men to experience depression. The reason for this is unknown, but changes in a woman's hormone levels may be related to depression.     What are the symptoms of depression?;  The symptoms of depression are different for every person. You may have one or many of the symptoms listed below. Your symptoms may include only emotional or only physical symptoms, or both.;     Emotional symptoms  Crying easily or for no reason ;  Feeling guilty or worthless ;  Feeling restless, irritated, and easily annoyed ;  Feeling sad, numb, or hopeless ;  Losing interest or pleasure in things you used to enjoy (including sex) ;  Thinking about death or suicide;     Physical symptoms  Changes in appetite (eating more than usual, or eating less than usual) ;  Feeling very tired all the time ;  Having other aches and pains that don't get better with treatment ;  Having trouble paying attention, recalling things, concentrating, and making decisions ;  Headaches, backaches, or digestive problems ;  Sleeping too much, or having problems sleeping ;  Unintended weight loss or gain;  The symptoms of depression may be different for children, teens, and seniors.;     What causes depression?  Depression may be caused by an imbalance of chemicals in the brain.  "Sometimes there aren't enough chemical messengers (called neurotransmitters) in the brain. Examples of neurotransmitters that affect your mood are serotonin (say: \"sair-a-tone-in\"), norepinephrine (say: \"nor-ep-in-ef-rin\"), and dopamine (say: \"dope-a-mean\"). A chemical imbalance in the brain may be caused by one or more of the following:;  Your genes. Sometimes depression is hereditary, meaning it runs in your family. If you have a parent or sibling who has depression, you may be more at risk for having depression yourself. ;  A medical condition. Problems with your thyroid, nutrient deficiencies, or chronic diseases such as heart disease, diabetes, or cancer may cause depression. ;  Events in your life. Depression can be triggered by stressful events in your life, such as the death of someone you love, a divorce, chronic illness, or loss of a job.   Medicines, drugs, or alcohol. Taking certain medicines, abusing drugs or alcohol, or having other illnesses can also lead to depression.;     Depression is not caused by personal weakness, laziness, or lack of willpower.;     Can giving birth cause depression?;  In the days following the birth of a baby, it is common for some mothers to have mood swings. They may feel a little depressed, have a hard time concentrating, lose their appetite, or find that they can't sleep well even when the baby is asleep. This is called the baby blues and goes away within 10 days after delivery. However, some women have worse symptoms or symptoms that last longer. This is called postpartum depression.     How is depression treated?  Depression can be treated with medicines, with counseling <http://[email or web address]/familydoctor/en/prevention-wellness/emotional-wellbeing/mental-health/therapy-and-counseling.html>, or with both. A nutritious diet, exercising on a regular basis, and avoiding alcohol, drugs, and too much caffeine can also help.;     How long will the depression last?  This " depends on how soon you get help. Left untreated, depression can last for weeks, months, or even years. The main risk in not getting treatment is suicide. Treatment can help depression lift in 8 to 12 weeks or less.;     What medicines are used to treat depression?;  Medicines that treat depression are called antidepressants. They help increase the number of chemical messengers (serotonin, norepinephrine, dopamine) in your brain.  Antidepressants work differently for different people. They also have different side effects. So, even if one medicine bothers you or doesn't work for you, another may help. You may notice improvement as soon as 1 week after you start taking the medicine. But you probably won't see the full effects for about 8 to 12 weeks. You may have side effects at first, but they tend to decrease after a couple of weeks. Don't stop taking the medicine without checking with your doctor first.;     How does counseling help?;   A combination of medicine and talk therapy is usually the most effective way of treating more severe depression. If you continue the combination treatment for at least a year, you are less likely to have depression come back.;you talk with a trained therapist or counselor about things that are going on in your life. The focus may be on your thoughts and beliefs, on things that happened in your past, or on your relationships. Or the focus may be on your behavior, how it's affecting you, and what you can do differently.      Tips on getting through depression  DO:  Pace yourself.   Get involved in activities that make you feel good or feel like you've achieved something.   Avoid drugs and alcohol. Both make depression worse. Both can cause dangerous side effects with antidepressant medicines.   Exercise often to make yourself feel better. Physical activity seems to cause a chemical reaction in the body that can improve your mood. Exercising 4 to 6 times a week for at least 30 minutes  "each time is a good goal. But even less activity can be helpful.   Eat balanced meals and healthful foods.   Get enough sleep.   Take your medicine and/or go to counseling as often as your doctor recommends. Your medicine won't work if you only take it once in a while.   Set small goals for yourself, because you may have less energy.   Encourage yourself.   Get as much information as you can about depression and how to treat it.   Call your doctor or the local suicide crisis center right away if you start thinking about suicide.  DON'T:  Don't isolate yourself. Stay in touch with your loved ones and friends, your Yazidism advisor, and your family doctor.   Don't believe negative thoughts you may have, such as blaming yourself or expecting to fail. This thinking is part of depression. These thoughts will go away as your depression lifts.   Don't blame yourself for your depression. You didn't cause it.   Don't make major life decisions (for example, about separation or divorce). You may not be thinking clearly while you are depressed, so the decisions you make at this time may not be the best ones for you. If you must make a big decision, ask someone you trust to help you.   Don't expect to do everything you normally can. Set a realistic schedule.   Don't get discouraged. It will take time for your depression to lift fully. Be patient with yourself.   Don't give up.     READ: \"The Mindfulness and Acceptance Workbook for Depression\" by Magdaleno and Jaswant  \"The Mindful Way Through Depression\" by Alberto Jerez Segal, and Cabot-Zinn        Suicide;  People who have depression sometimes think about suicide. This thinking is a common part of the depression. If you have thoughts about hurting yourself, tell someone. You could tell your doctor, your friends, your family, or call your local suicide hotline, such as the National Suicide Prevention Lifeline at 1-849.855.7355.;    "

## 2023-09-22 DIAGNOSIS — E78.2 MIXED HYPERLIPIDEMIA: ICD-10-CM

## 2023-09-27 RX ORDER — ATORVASTATIN CALCIUM 40 MG/1
40 TABLET, FILM COATED ORAL DAILY
Qty: 90 TABLET | Refills: 0 | Status: SHIPPED | OUTPATIENT
Start: 2023-09-27 | End: 2023-12-26

## 2023-11-17 ENCOUNTER — TELEPHONE (OUTPATIENT)
Dept: PRIMARY CARE | Facility: CLINIC | Age: 68
End: 2023-11-17
Payer: COMMERCIAL

## 2023-11-17 DIAGNOSIS — K21.00 GASTROESOPHAGEAL REFLUX DISEASE WITH ESOPHAGITIS WITHOUT HEMORRHAGE: ICD-10-CM

## 2023-11-17 DIAGNOSIS — Z12.11 ENCOUNTER FOR SCREENING COLONOSCOPY: Primary | ICD-10-CM

## 2023-11-17 NOTE — TELEPHONE ENCOUNTER
Patient called stating that she will need a referral for Dr. Guardado Gastro fax order for to [phone]

## 2023-12-13 DIAGNOSIS — Z12.11 ENCOUNTER FOR SCREENING FOR COLORECTAL MALIGNANT NEOPLASM: ICD-10-CM

## 2023-12-13 DIAGNOSIS — Z12.12 ENCOUNTER FOR SCREENING FOR COLORECTAL MALIGNANT NEOPLASM: ICD-10-CM

## 2023-12-24 DIAGNOSIS — E78.2 MIXED HYPERLIPIDEMIA: ICD-10-CM

## 2023-12-26 RX ORDER — ATORVASTATIN CALCIUM 40 MG/1
40 TABLET, FILM COATED ORAL DAILY
Qty: 90 TABLET | Refills: 0 | Status: SHIPPED | OUTPATIENT
Start: 2023-12-26 | End: 2024-02-12 | Stop reason: SDUPTHER

## 2024-01-08 LAB — NONINV COLON CA DNA+OCC BLD SCRN STL QL: POSITIVE

## 2024-01-17 DIAGNOSIS — E03.9 ACQUIRED HYPOTHYROIDISM: ICD-10-CM

## 2024-01-17 RX ORDER — LEVOTHYROXINE SODIUM 25 UG/1
25 TABLET ORAL
Qty: 90 TABLET | Refills: 0 | Status: SHIPPED | OUTPATIENT
Start: 2024-01-17 | End: 2024-04-28

## 2024-01-29 ENCOUNTER — APPOINTMENT (OUTPATIENT)
Dept: PRIMARY CARE | Facility: CLINIC | Age: 69
End: 2024-01-29
Payer: COMMERCIAL

## 2024-02-12 ENCOUNTER — OFFICE VISIT (OUTPATIENT)
Dept: PRIMARY CARE | Facility: CLINIC | Age: 69
End: 2024-02-12
Payer: COMMERCIAL

## 2024-02-12 VITALS
OXYGEN SATURATION: 93 % | SYSTOLIC BLOOD PRESSURE: 114 MMHG | WEIGHT: 172 LBS | DIASTOLIC BLOOD PRESSURE: 75 MMHG | HEIGHT: 67 IN | HEART RATE: 82 BPM | BODY MASS INDEX: 27 KG/M2

## 2024-02-12 DIAGNOSIS — E53.8 VITAMIN B 12 DEFICIENCY: ICD-10-CM

## 2024-02-12 DIAGNOSIS — K21.00 GASTROESOPHAGEAL REFLUX DISEASE WITH ESOPHAGITIS WITHOUT HEMORRHAGE: ICD-10-CM

## 2024-02-12 DIAGNOSIS — R73.09 ABNORMAL GLUCOSE: ICD-10-CM

## 2024-02-12 DIAGNOSIS — Z71.89 CARDIAC RISK COUNSELING: ICD-10-CM

## 2024-02-12 DIAGNOSIS — H61.21 IMPACTED CERUMEN OF RIGHT EAR: ICD-10-CM

## 2024-02-12 DIAGNOSIS — Z00.00 ROUTINE GENERAL MEDICAL EXAMINATION AT HEALTH CARE FACILITY: Primary | ICD-10-CM

## 2024-02-12 DIAGNOSIS — Z13.1 DIABETES MELLITUS SCREENING: ICD-10-CM

## 2024-02-12 DIAGNOSIS — E03.9 ACQUIRED HYPOTHYROIDISM: ICD-10-CM

## 2024-02-12 DIAGNOSIS — Z78.0 ASYMPTOMATIC MENOPAUSAL STATE: ICD-10-CM

## 2024-02-12 DIAGNOSIS — Z12.12 SCREENING FOR COLORECTAL CANCER: ICD-10-CM

## 2024-02-12 DIAGNOSIS — Z13.6 SCREENING FOR CARDIOVASCULAR CONDITION: ICD-10-CM

## 2024-02-12 DIAGNOSIS — Z12.11 SCREENING FOR COLORECTAL CANCER: ICD-10-CM

## 2024-02-12 DIAGNOSIS — E78.2 MIXED HYPERLIPIDEMIA: ICD-10-CM

## 2024-02-12 DIAGNOSIS — M85.80 OSTEOPENIA, UNSPECIFIED LOCATION: ICD-10-CM

## 2024-02-12 DIAGNOSIS — F33.9 CHRONIC MAJOR DEPRESSIVE DISORDER, RECURRENT EPISODE (CMS-HCC): ICD-10-CM

## 2024-02-12 PROBLEM — Z13.820 ENCOUNTER FOR OSTEOPOROSIS SCREENING IN ASYMPTOMATIC POSTMENOPAUSAL PATIENT: Status: RESOLVED | Noted: 2023-03-27 | Resolved: 2024-02-12

## 2024-02-12 PROCEDURE — 1123F ACP DISCUSS/DSCN MKR DOCD: CPT | Performed by: FAMILY MEDICINE

## 2024-02-12 PROCEDURE — 99397 PER PM REEVAL EST PAT 65+ YR: CPT | Performed by: FAMILY MEDICINE

## 2024-02-12 PROCEDURE — 99214 OFFICE O/P EST MOD 30 MIN: CPT | Performed by: FAMILY MEDICINE

## 2024-02-12 PROCEDURE — 1160F RVW MEDS BY RX/DR IN RCRD: CPT | Performed by: FAMILY MEDICINE

## 2024-02-12 PROCEDURE — G0439 PPPS, SUBSEQ VISIT: HCPCS | Performed by: FAMILY MEDICINE

## 2024-02-12 PROCEDURE — 1033F TOBACCO NONSMOKER NOR 2NDHND: CPT | Performed by: FAMILY MEDICINE

## 2024-02-12 PROCEDURE — 99497 ADVNCD CARE PLAN 30 MIN: CPT | Performed by: FAMILY MEDICINE

## 2024-02-12 PROCEDURE — 69209 REMOVE IMPACTED EAR WAX UNI: CPT | Performed by: FAMILY MEDICINE

## 2024-02-12 PROCEDURE — 1158F ADVNC CARE PLAN TLK DOCD: CPT | Performed by: FAMILY MEDICINE

## 2024-02-12 PROCEDURE — 1159F MED LIST DOCD IN RCRD: CPT | Performed by: FAMILY MEDICINE

## 2024-02-12 PROCEDURE — G0444 DEPRESSION SCREEN ANNUAL: HCPCS | Performed by: FAMILY MEDICINE

## 2024-02-12 PROCEDURE — 1036F TOBACCO NON-USER: CPT | Performed by: FAMILY MEDICINE

## 2024-02-12 PROCEDURE — 1170F FXNL STATUS ASSESSED: CPT | Performed by: FAMILY MEDICINE

## 2024-02-12 RX ORDER — CELECOXIB 200 MG/1
200 CAPSULE ORAL DAILY
Qty: 90 CAPSULE | Refills: 1 | Status: SHIPPED | OUTPATIENT
Start: 2024-02-12

## 2024-02-12 RX ORDER — ATORVASTATIN CALCIUM 40 MG/1
40 TABLET, FILM COATED ORAL DAILY
Qty: 90 TABLET | Refills: 1 | Status: SHIPPED | OUTPATIENT
Start: 2024-02-12

## 2024-02-12 RX ORDER — CITALOPRAM 20 MG/1
30 TABLET, FILM COATED ORAL DAILY
Qty: 135 TABLET | Refills: 1 | Status: SHIPPED | OUTPATIENT
Start: 2024-02-12

## 2024-02-12 RX ORDER — PANTOPRAZOLE SODIUM 40 MG/1
40 TABLET, DELAYED RELEASE ORAL
Qty: 180 TABLET | Refills: 1 | Status: SHIPPED | OUTPATIENT
Start: 2024-02-12

## 2024-02-12 ASSESSMENT — ENCOUNTER SYMPTOMS
DIZZINESS: 0
SHORTNESS OF BREATH: 0
WOUND: 0
DIAPHORESIS: 0
EYE PAIN: 0
FACIAL SWELLING: 0
MYALGIAS: 0
FREQUENCY: 0
SINUS PRESSURE: 0
JOINT SWELLING: 0
RHINORRHEA: 0
SLEEP DISTURBANCE: 0
WHEEZING: 0
CONSTIPATION: 0
HEMATURIA: 0
TROUBLE SWALLOWING: 0
ABDOMINAL PAIN: 0
NERVOUS/ANXIOUS: 0
CHEST TIGHTNESS: 0
POLYDIPSIA: 0
ADENOPATHY: 0
LOSS OF SENSATION IN FEET: 0
VOICE CHANGE: 0
SINUS PAIN: 0
EYE DISCHARGE: 0
APPETITE CHANGE: 0
AGITATION: 0
VOMITING: 0
BLOOD IN STOOL: 0
FEVER: 0
DYSURIA: 0
NECK STIFFNESS: 0
POLYPHAGIA: 0
COLOR CHANGE: 0
DIARRHEA: 0
UNEXPECTED WEIGHT CHANGE: 0
DEPRESSION: 0
OCCASIONAL FEELINGS OF UNSTEADINESS: 0
BACK PAIN: 0
EYE ITCHING: 0
CHILLS: 0
SORE THROAT: 0
EYE REDNESS: 0
BRUISES/BLEEDS EASILY: 0
FLANK PAIN: 0
FATIGUE: 0
ARTHRALGIAS: 0
COUGH: 0
NAUSEA: 0
ACTIVITY CHANGE: 0
PALPITATIONS: 0

## 2024-02-12 ASSESSMENT — PATIENT HEALTH QUESTIONNAIRE - PHQ9
1. LITTLE INTEREST OR PLEASURE IN DOING THINGS: NOT AT ALL
2. FEELING DOWN, DEPRESSED OR HOPELESS: NOT AT ALL
SUM OF ALL RESPONSES TO PHQ9 QUESTIONS 1 AND 2: 0

## 2024-02-12 ASSESSMENT — ACTIVITIES OF DAILY LIVING (ADL)
BATHING: INDEPENDENT
DOING_HOUSEWORK: INDEPENDENT
DRESSING: INDEPENDENT
MANAGING_FINANCES: INDEPENDENT
TAKING_MEDICATION: INDEPENDENT
GROCERY_SHOPPING: INDEPENDENT

## 2024-02-12 NOTE — PROGRESS NOTES
Subjective   Reason for Visit: Tammy Marks is an 69 y.o. female here for a Medicare Wellness visit.     Past Medical, Surgical, and Family History reviewed and updated in chart.    Reviewed all medications by prescribing practitioner or clinical pharmacist (such as prescriptions, OTCs, herbal therapies and supplements) and documented in the medical record.    HPI    Advance Care Planning Note     Discussion Date: 02/12/24   Discussion Participants: patient    The patient wishes to discuss Advance Care Planning today and the following is a brief summary of our discussion.     Patient has capacity to make their own medical decisions: Yes  Health Care Agent/Surrogate Decision Maker documented in chart: Yes    Documents on file and valid:  Advance Directive/Living Will: No   Health Care Power of :  No documents and spouse is POA  Other: code status discussed    Communication of Medical Status/Prognosis:   good     Communication of Treatment Goals/Options:   good     Treatment Decisions  Goals of Care: survival is prioritized, if goals for quality or survival can reasonably be achieved   agree  Follow Up Plan  To bring in POA and Living will  Team Members  PCP  Time Statement: Total face to face time spent on advance care planning was 16 minutes with 16 minutes spent in counseling, including the explanation.    Sonia Wiley MD  2/12/2024 11:34 AM  Patient Care Team:  Sonia Wiley MD as PCP - General  Sonia Wiley MD as PCP - [facility] PCP   Patient Care Team:  Sonia Wiley MD as PCP - General  Sonia Wiley MD as PCP - [facility] PCP   Immunization History   Administered Date(s) Administered    Flu vaccine, quadrivalent, high-dose, preservative free, age 65y+ (FLUZONE) 09/20/2023    Influenza, seasonal, injectable 10/19/2017, 10/29/2018, 10/04/2019, 09/23/2020, 10/15/2021    Moderna COVID-19 vaccine, Fall 2023, 12 yeasrs and older (50mcg/0.5mL)  01/04/2024    Moderna COVID-19 vaccine, bivalent, blue cap/gray label *Check age/dose* 04/18/2023    Moderna SARS-CoV-2 Vaccination 03/04/2021, 04/01/2021, 11/29/2021    Novel influenza-H1N1-09, preservative-free 10/30/2009    Pneumococcal conjugate vaccine, 13-valent (PREVNAR 13) 07/20/2020, 07/20/2020    Pneumococcal polysaccharide vaccine, 23-valent, age 2 years and older (PNEUMOVAX 23) 08/17/2021    Pneumococcal, Unspecified 07/20/2020    RESPIRATORY SYNCYTIAL VIRUS (RSV), ELIGIBLE PREGNANT PTS, 0.5 ML (ABRYSVO) 01/04/2024    Td vaccine, age 7 years and older (TENIVAC) 01/23/2020    Tdap vaccine, age 7 year and older (BOOSTRIX, ADACEL) 12/28/2009, 01/23/2020    Zoster vaccine, recombinant, adult (SHINGRIX) 11/10/2021, 08/11/2022       Review of Systems   Constitutional:  Negative for activity change, appetite change, chills, diaphoresis, fatigue, fever and unexpected weight change.   HENT:  Negative for congestion, dental problem, drooling, ear discharge, ear pain, facial swelling, hearing loss, nosebleeds, postnasal drip, rhinorrhea, sinus pressure, sinus pain, sneezing, sore throat, tinnitus, trouble swallowing and voice change.    Eyes:  Negative for pain, discharge, redness, itching and visual disturbance.   Respiratory:  Negative for cough, chest tightness, shortness of breath and wheezing.    Cardiovascular:  Negative for chest pain, palpitations and leg swelling.   Gastrointestinal:  Negative for abdominal pain, blood in stool, constipation, diarrhea, nausea and vomiting.   Endocrine: Negative for cold intolerance, heat intolerance, polydipsia, polyphagia and polyuria.   Genitourinary:  Negative for decreased urine volume, dysuria, flank pain, frequency, hematuria and urgency.   Musculoskeletal:  Negative for arthralgias, back pain, gait problem, joint swelling, myalgias and neck stiffness.   Skin:  Negative for color change, pallor, rash and wound.   Neurological:  Negative for dizziness.  "  Hematological:  Negative for adenopathy. Does not bruise/bleed easily.   Psychiatric/Behavioral:  Negative for agitation, behavioral problems and sleep disturbance. The patient is not nervous/anxious.        Objective   Vitals:  Blood Pressure 114/75   Pulse 82   Height 1.702 m (5' 7\")   Weight 78 kg (172 lb)   Last Menstrual Period  (LMP Unknown)   Oxygen Saturation 93%   Body Mass Index 26.94 kg/m²       Physical Exam  Vitals and nursing note reviewed.   Constitutional:       General: She is not in acute distress.     Appearance: Normal appearance. She is normal weight. She is not ill-appearing.   HENT:      Head: Normocephalic.      Right Ear: Tympanic membrane, ear canal and external ear normal.      Left Ear: Tympanic membrane, ear canal and external ear normal.      Nose: Nose normal. No rhinorrhea.      Mouth/Throat:      Mouth: Mucous membranes are moist.      Pharynx: Oropharynx is clear.   Eyes:      Extraocular Movements: Extraocular movements intact.      Conjunctiva/sclera: Conjunctivae normal.      Pupils: Pupils are equal, round, and reactive to light.   Cardiovascular:      Rate and Rhythm: Normal rate and regular rhythm.      Pulses: Normal pulses.      Heart sounds: Normal heart sounds.   Pulmonary:      Effort: Pulmonary effort is normal. No respiratory distress.      Breath sounds: Normal breath sounds. No wheezing.   Musculoskeletal:         General: Normal range of motion.      Cervical back: Normal range of motion and neck supple. No rigidity or tenderness.      Right lower leg: No edema.      Left lower leg: No edema.   Lymphadenopathy:      Cervical: No cervical adenopathy.   Skin:     General: Skin is warm and dry.   Neurological:      General: No focal deficit present.      Mental Status: She is alert and oriented to person, place, and time.      Sensory: No sensory deficit.      Motor: No weakness.      Coordination: Coordination normal.   Psychiatric:         Mood and Affect: " Mood normal.         Behavior: Behavior normal.         Thought Content: Thought content normal.         Judgment: Judgment normal.     Patient ID: Tammy Marks is a 69 y.o. female.    Ear Cerumen Removal    Date/Time: 2/12/2024 11:43 AM    Performed by: Sonia Wiley MD  Authorized by: Sonia Wiley MD    Consent:     Consent obtained:  Verbal    Consent given by:  Patient    Risks, benefits, and alternatives were discussed: yes      Risks discussed:  Bleeding, infection, pain, TM perforation, incomplete removal and dizziness    Alternatives discussed:  No treatment, delayed treatment, alternative treatment, observation and referral  Universal protocol:     Procedure explained and questions answered to patient or proxy's satisfaction: yes      Patient identity confirmed:  Verbally with patient  Procedure details:     Location:  R ear    Procedure type: irrigation      Procedure outcomes: cerumen removed    Post-procedure details:     Inspection:  TM intact and ear canal clear    Hearing quality:  Normal    Procedure completion:  Tolerated well, no immediate complications      Assessment/Plan   Problem List Items Addressed This Visit       Abnormal glucose    Relevant Orders    Hemoglobin A1C    Chronic major depressive disorder, recurrent episode (CMS/HCC)    Relevant Medications    citalopram (CeleXA) 20 mg tablet    Other Relevant Orders    Magnesium    Gastroesophageal reflux disease with esophagitis without hemorrhage    Relevant Medications    pantoprazole (ProtoNix) 40 mg EC tablet    Mixed hyperlipidemia    Relevant Medications    atorvastatin (Lipitor) 40 mg tablet    Other Relevant Orders    Comprehensive Metabolic Panel    Osteopenia    Relevant Medications    celecoxib (CeleBREX) 200 mg capsule    Other Relevant Orders    Magnesium    Vitamin B 12 deficiency    Relevant Orders    Vitamin B12    CBC and Auto Differential     Other Visit Diagnoses       Routine general medical examination at [facility]  care facility    -  Primary    Diabetes mellitus screening        Relevant Orders    Comprehensive Metabolic Panel    Hemoglobin A1C    Screening for cardiovascular condition        Relevant Orders    Lipid panel    Cardiac risk counseling        Asymptomatic menopausal state        Relevant Orders    XR DEXA bone density    Screening for colorectal cancer        Relevant Orders    Referral to Gastroenterology    Acquired hypothyroidism        Relevant Orders    TSH with reflex to Free T4 if abnormal          The 10-year ASCVD risk score (Jennifer BOGGS, et al., 2019) is: 6.5%    Values used to calculate the score:      Age: 69 years      Sex: Female      Is Non- : No      Diabetic: No      Tobacco smoker: No      Systolic Blood Pressure: 114 mmHg      Is BP treated: No      HDL Cholesterol: 66.2 mg/dL      Total Cholesterol: 193 mg/dL       Consider Clevr tea or coffee for help with stress.  Current Outpatient Medications   Medication Sig Dispense Refill    cholecalciferol (Vitamin D-3) 25 MCG (1000 UT) capsule Take 1 capsule (25 mcg) by mouth once daily.      cyanocobalamin, vitamin B-12, 3,000 mcg capsule once every 24 hours.      levothyroxine (Synthroid, Levoxyl) 25 mcg tablet TAKE ONE TABLET BY MOUTH EVERY MORNING ON AN EMPTY STOMACH 90 tablet 0    atorvastatin (Lipitor) 40 mg tablet Take 1 tablet (40 mg) by mouth once daily. 90 tablet 1    celecoxib (CeleBREX) 200 mg capsule Take 1 capsule (200 mg) by mouth once daily. 90 capsule 1    citalopram (CeleXA) 20 mg tablet Take 1.5 tablets (30 mg) by mouth once daily. 135 tablet 1    pantoprazole (ProtoNix) 40 mg EC tablet Take 1 tablet (40 mg) by mouth 2 times a day before meals. take before breakfast and dinner 180 tablet 1     No current facility-administered medications for this visit.     F/U in 4-6 months for hyperlipidemia and depression

## 2024-02-12 NOTE — PATIENT INSTRUCTIONS
F/U in 4-6 months for hyperlipidemia and depression    Current Outpatient Medications   Medication Sig Dispense Refill    cholecalciferol (Vitamin D-3) 25 MCG (1000 UT) capsule Take 1 capsule (25 mcg) by mouth once daily.      cyanocobalamin, vitamin B-12, 3,000 mcg capsule once every 24 hours.      levothyroxine (Synthroid, Levoxyl) 25 mcg tablet TAKE ONE TABLET BY MOUTH EVERY MORNING ON AN EMPTY STOMACH 90 tablet 0    atorvastatin (Lipitor) 40 mg tablet Take 1 tablet (40 mg) by mouth once daily. 90 tablet 1    celecoxib (CeleBREX) 200 mg capsule Take 1 capsule (200 mg) by mouth once daily. 90 capsule 1    citalopram (CeleXA) 20 mg tablet Take 1.5 tablets (30 mg) by mouth once daily. 135 tablet 1    pantoprazole (ProtoNix) 40 mg EC tablet Take 1 tablet (40 mg) by mouth 2 times a day before meals. take before breakfast and dinner 180 tablet 1     No current facility-administered medications for this visit.

## 2024-02-12 NOTE — ACP (ADVANCE CARE PLANNING)
Confirming Previous Code Status:   Advance Care Planning Note     Discussion Date: 02/12/24   Discussion Participants: patient    The patient wishes to discuss Advance Care Planning today and the following is a brief summary of our discussion.     Patient has capacity to make their own medical decisions: Yes  Health Care Agent/Surrogate Decision Maker documented in chart: Yes    Documents on file and valid:  Advance Directive/Living Will: No   Health Care Power of : No  Other: discusses    Communication of Medical Status/Prognosis:   good     Communication of Treatment Goals/Options:   good     Treatment Decisions  Goals of Care: survival is prioritized, if goals for quality or survival can reasonably be achieved   agree  Follow Up Plan  To bring in POA and living will documents  Team Members  PCP  Time Statement: Total face to face time spent on advance care planning was 16 minutes with 16 minutes spent in counseling, including the explanation.    Sonia Wiley MD  2/12/2024 11:31 AM

## 2024-04-11 ENCOUNTER — TELEPHONE (OUTPATIENT)
Dept: PRIMARY CARE | Facility: CLINIC | Age: 69
End: 2024-04-11
Payer: COMMERCIAL

## 2024-04-11 NOTE — TELEPHONE ENCOUNTER
LM for pt to call back and schedule a TCM if not appointments available for Dr. Inez moreno to schedule with Jyoti as per Jessie.

## 2024-04-24 ENCOUNTER — TELEMEDICINE (OUTPATIENT)
Dept: PRIMARY CARE | Facility: CLINIC | Age: 69
End: 2024-04-24
Payer: COMMERCIAL

## 2024-04-24 DIAGNOSIS — J01.00 ACUTE NON-RECURRENT MAXILLARY SINUSITIS: ICD-10-CM

## 2024-04-24 DIAGNOSIS — R09.81 SINUS CONGESTION: ICD-10-CM

## 2024-04-24 DIAGNOSIS — R05.1 ACUTE COUGH: Primary | ICD-10-CM

## 2024-04-24 PROBLEM — E03.8 OTHER SPECIFIED HYPOTHYROIDISM: Status: ACTIVE | Noted: 2024-03-21

## 2024-04-24 PROBLEM — C18.7 MALIGNANT NEOPLASM OF SIGMOID COLON (MULTI): Status: ACTIVE | Noted: 2024-03-18

## 2024-04-24 PROBLEM — R73.09 ABNORMAL GLUCOSE: Status: RESOLVED | Noted: 2023-02-11 | Resolved: 2024-04-24

## 2024-04-24 PROCEDURE — 1123F ACP DISCUSS/DSCN MKR DOCD: CPT | Performed by: FAMILY MEDICINE

## 2024-04-24 PROCEDURE — 1036F TOBACCO NON-USER: CPT | Performed by: FAMILY MEDICINE

## 2024-04-24 PROCEDURE — 99214 OFFICE O/P EST MOD 30 MIN: CPT | Performed by: FAMILY MEDICINE

## 2024-04-24 PROCEDURE — 1159F MED LIST DOCD IN RCRD: CPT | Performed by: FAMILY MEDICINE

## 2024-04-24 PROCEDURE — 1160F RVW MEDS BY RX/DR IN RCRD: CPT | Performed by: FAMILY MEDICINE

## 2024-04-24 RX ORDER — AMOXICILLIN AND CLAVULANATE POTASSIUM 875; 125 MG/1; MG/1
875 TABLET, FILM COATED ORAL 2 TIMES DAILY
Qty: 20 TABLET | Refills: 0 | Status: SHIPPED | OUTPATIENT
Start: 2024-04-24 | End: 2024-05-04

## 2024-04-24 ASSESSMENT — ENCOUNTER SYMPTOMS
SORE THROAT: 1
DYSURIA: 0
NECK PAIN: 0
COUGH: 1
VOMITING: 0
NAUSEA: 0
ABDOMINAL PAIN: 0
DIARRHEA: 0
SWOLLEN GLANDS: 0
MYALGIAS: 1
HEADACHES: 1
HEARTBURN: 1
CHILLS: 1
WEIGHT LOSS: 0
SHORTNESS OF BREATH: 1
WHEEZING: 0
HEMOPTYSIS: 0
SINUS PAIN: 0
RHINORRHEA: 1
SWEATS: 0

## 2024-04-24 NOTE — PATIENT INSTRUCTIONS
Start antibiotics  Call if any worse or if no improvement in 3-5 days  Increase fluids  OTC decongestants as needed  Saline and OTC flonase as needed  Mucinex Fast Max prn

## 2024-04-24 NOTE — PROGRESS NOTES
Subjective   Patient ID: Tammy Marks is a 69 y.o. female who presents for URI (2 weeks. No COVID test).  Today she is accompanied by alone.   Seen via Audio-visual with patient's consent. I performed this visit using real-time telehealth tools, including an audio/video connection between patient and [facility].    URI   This is a new problem. The current episode started 1 to 4 weeks ago. The problem has been gradually worsening. There has been no fever. Associated symptoms include chest pain, congestion, coughing, headaches, rhinorrhea and a sore throat. Pertinent negatives include no abdominal pain, diarrhea, dysuria, ear pain, joint pain, joint swelling, nausea, neck pain, plugged ear sensation, rash, sinus pain, sneezing, swollen glands, vomiting or wheezing. She has tried decongestant and acetaminophen for the symptoms. The treatment provided mild relief.   Cough  This is a new problem. The current episode started 1 to 4 weeks ago. The problem has been waxing and waning. The problem occurs every few minutes. The cough is Productive of purulent sputum. Associated symptoms include chest pain, chills, ear congestion, headaches, heartburn, myalgias, nasal congestion, postnasal drip, rhinorrhea, a sore throat and shortness of breath. Pertinent negatives include no ear pain, hemoptysis, rash, sweats, weight loss or wheezing. Nothing aggravates the symptoms. She has tried OTC cough suppressant for the symptoms. The treatment provided mild relief.       Review of Systems   Constitutional:  Positive for chills. Negative for weight loss.   HENT:  Positive for congestion, postnasal drip, rhinorrhea and sore throat. Negative for ear pain, sinus pain and sneezing.    Respiratory:  Positive for cough and shortness of breath. Negative for hemoptysis and wheezing.    Cardiovascular:  Positive for chest pain.   Gastrointestinal:  Positive for heartburn. Negative for abdominal pain, diarrhea, nausea and vomiting.    Genitourinary:  Negative for dysuria.   Musculoskeletal:  Positive for myalgias. Negative for joint pain and neck pain.   Skin:  Negative for rash.   Neurological:  Positive for headaches.       Objective   LMP  (LMP Unknown)   BSA: There is no height or weight on file to calculate BSA.  Growth percentiles: Facility age limit for growth %angel is 20 years. Facility age limit for growth %angel is 20 years.     Physical Exam  Nursing note reviewed.   Constitutional:       Appearance: Normal appearance.   Pulmonary:      Effort: Pulmonary effort is normal. No respiratory distress.   Neurological:      Mental Status: She is alert.   Psychiatric:         Mood and Affect: Mood normal.         Behavior: Behavior normal.         Thought Content: Thought content normal.         Judgment: Judgment normal.         Assessment/Plan   Problem List Items Addressed This Visit    None  Visit Diagnoses         Codes    Acute cough    -  Primary R05.1    Relevant Medications    amoxicillin-pot clavulanate (Augmentin) 875-125 mg tablet    Acute non-recurrent maxillary sinusitis     J01.00    Relevant Medications    amoxicillin-pot clavulanate (Augmentin) 875-125 mg tablet    Sinus congestion     R09.81    Relevant Medications    amoxicillin-pot clavulanate (Augmentin) 875-125 mg tablet        Reviewed surgical history and new allergies.    Start antibiotics  Call if any worse or if no improvement in 3-5 days  Increase fluids  OTC decongestants as needed  Saline and OTC flonase as needed  Mucinex Fast Max prn

## 2024-04-26 DIAGNOSIS — E03.9 ACQUIRED HYPOTHYROIDISM: ICD-10-CM

## 2024-04-28 RX ORDER — LEVOTHYROXINE SODIUM 25 UG/1
25 TABLET ORAL
Qty: 90 TABLET | Refills: 0 | Status: SHIPPED | OUTPATIENT
Start: 2024-04-28

## 2024-04-30 ENCOUNTER — OFFICE VISIT (OUTPATIENT)
Dept: PRIMARY CARE | Facility: CLINIC | Age: 69
End: 2024-04-30
Payer: COMMERCIAL

## 2024-04-30 VITALS
OXYGEN SATURATION: 95 % | SYSTOLIC BLOOD PRESSURE: 105 MMHG | HEART RATE: 84 BPM | WEIGHT: 168.25 LBS | DIASTOLIC BLOOD PRESSURE: 70 MMHG | BODY MASS INDEX: 26.35 KG/M2

## 2024-04-30 DIAGNOSIS — C18.7 MALIGNANT NEOPLASM OF SIGMOID COLON (MULTI): Primary | ICD-10-CM

## 2024-04-30 DIAGNOSIS — E03.9 ACQUIRED HYPOTHYROIDISM: ICD-10-CM

## 2024-04-30 DIAGNOSIS — F33.9 CHRONIC MAJOR DEPRESSIVE DISORDER, RECURRENT EPISODE (CMS-HCC): ICD-10-CM

## 2024-04-30 DIAGNOSIS — R05.1 ACUTE COUGH: ICD-10-CM

## 2024-04-30 PROCEDURE — 1159F MED LIST DOCD IN RCRD: CPT | Performed by: FAMILY MEDICINE

## 2024-04-30 PROCEDURE — 1160F RVW MEDS BY RX/DR IN RCRD: CPT | Performed by: FAMILY MEDICINE

## 2024-04-30 PROCEDURE — 99214 OFFICE O/P EST MOD 30 MIN: CPT | Performed by: FAMILY MEDICINE

## 2024-04-30 PROCEDURE — 1123F ACP DISCUSS/DSCN MKR DOCD: CPT | Performed by: FAMILY MEDICINE

## 2024-04-30 RX ORDER — LEVOTHYROXINE SODIUM 25 UG/1
25 TABLET ORAL
Qty: 90 TABLET | Refills: 0 | Status: CANCELLED | OUTPATIENT
Start: 2024-04-30

## 2024-04-30 RX ORDER — METHYLPREDNISOLONE 4 MG/1
TABLET ORAL
Qty: 21 TABLET | Refills: 0 | Status: SHIPPED | OUTPATIENT
Start: 2024-04-30 | End: 2024-05-07

## 2024-04-30 RX ORDER — ALBUTEROL SULFATE 90 UG/1
2 AEROSOL, METERED RESPIRATORY (INHALATION) EVERY 4 HOURS PRN
Qty: 8 G | Refills: 0 | Status: SHIPPED | OUTPATIENT
Start: 2024-04-30 | End: 2024-05-30

## 2024-04-30 NOTE — ASSESSMENT & PLAN NOTE
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

## 2024-04-30 NOTE — ASSESSMENT & PLAN NOTE
Stable on current medications  Continue meds and exercise.   Has increased stress with all the new health issues

## 2024-04-30 NOTE — PROGRESS NOTES
Subjective   Patient ID: Tammy Marks is a 69 y.o. female who presents for Post-op Visit (Colon cancer removal. 4/3).  Today she is accompanied by alone.     HPI  Here for a hospital follow up. Recently had surgery for a colon mass and there are still cells left that they will be deciding what type of treatment she will need going forward. Her  has been sick and she is not able to keep up with his health as well as his. It has been a little stressful for her. Otherwise she is doing well and has no new concerns.     Review of Systems   Constitutional:  Negative for activity change, appetite change, chills, diaphoresis, fatigue, fever and unexpected weight change.   HENT:  Negative for congestion, dental problem, drooling, ear discharge, ear pain, facial swelling, hearing loss, nosebleeds, postnasal drip, rhinorrhea, sinus pressure, sinus pain, sneezing, sore throat, tinnitus, trouble swallowing and voice change.    Eyes:  Negative for pain, discharge, redness, itching and visual disturbance.   Respiratory:  Negative for cough, chest tightness, shortness of breath and wheezing.    Cardiovascular:  Negative for chest pain, palpitations and leg swelling.   Gastrointestinal:  Negative for abdominal pain, blood in stool, constipation, diarrhea, nausea and vomiting.   Endocrine: Negative for cold intolerance, heat intolerance, polydipsia, polyphagia and polyuria.   Genitourinary:  Negative for decreased urine volume, dysuria, flank pain, frequency, hematuria and urgency.   Musculoskeletal:  Negative for arthralgias, back pain, gait problem, joint swelling, myalgias and neck stiffness.   Skin:  Negative for color change, pallor, rash and wound.   Neurological:  Negative for dizziness.   Hematological:  Negative for adenopathy. Does not bruise/bleed easily.   Psychiatric/Behavioral:  Negative for agitation, behavioral problems and sleep disturbance. The patient is not nervous/anxious.        Objective   /70    Pulse 84   Wt 76.3 kg (168 lb 4 oz)   LMP  (LMP Unknown)   SpO2 95%   BMI 26.35 kg/m²   BSA: 1.9 meters squared  Growth percentiles: Facility age limit for growth %angel is 20 years. Facility age limit for growth %angel is 20 years.     Physical Exam  Vitals and nursing note reviewed.   Constitutional:       General: She is not in acute distress.     Appearance: Normal appearance. She is normal weight. She is not ill-appearing.   HENT:      Head: Normocephalic.      Right Ear: Tympanic membrane, ear canal and external ear normal.      Left Ear: Tympanic membrane, ear canal and external ear normal.      Nose: Nose normal. No rhinorrhea.      Mouth/Throat:      Mouth: Mucous membranes are moist.      Pharynx: Oropharynx is clear.   Eyes:      Extraocular Movements: Extraocular movements intact.      Conjunctiva/sclera: Conjunctivae normal.      Pupils: Pupils are equal, round, and reactive to light.   Cardiovascular:      Rate and Rhythm: Normal rate and regular rhythm.      Pulses: Normal pulses.      Heart sounds: Normal heart sounds.   Pulmonary:      Effort: Pulmonary effort is normal. No respiratory distress.      Breath sounds: Normal breath sounds. No wheezing.   Musculoskeletal:         General: Normal range of motion.      Cervical back: Normal range of motion and neck supple. No rigidity or tenderness.      Right lower leg: No edema.      Left lower leg: No edema.   Lymphadenopathy:      Cervical: No cervical adenopathy.   Skin:     General: Skin is warm and dry.   Neurological:      General: No focal deficit present.      Mental Status: She is alert and oriented to person, place, and time.      Sensory: No sensory deficit.      Motor: No weakness.      Coordination: Coordination normal.   Psychiatric:         Mood and Affect: Mood normal.         Behavior: Behavior normal.         Thought Content: Thought content normal.         Judgment: Judgment normal.         Assessment/Plan   Problem List Items  Addressed This Visit             ICD-10-CM    Chronic major depressive disorder, recurrent episode (CMS-HCC) F33.9     Stable on current medications  Continue meds and exercise.   Has increased stress with all the new health issues          Relevant Orders    Follow Up In [facility]    Malignant neoplasm of sigmoid colon (Multi) - Primary C18.7     Had a resection and now has cancer cells in the tissue surrounding and will be going to an oncologist           Other Visit Diagnoses         Codes    Acquired hypothyroidism     E03.9    Acute cough     R05.1    Relevant Medications    albuterol (Ventolin HFA) 90 mcg/actuation inhaler    methylPREDNISolone (Medrol Dospak) 4 mg tablets            Current Outpatient Medications   Medication Sig Dispense Refill    amoxicillin-pot clavulanate (Augmentin) 875-125 mg tablet Take 1 tablet (875 mg) by mouth 2 times a day for 10 days. 20 tablet 0    atorvastatin (Lipitor) 40 mg tablet Take 1 tablet (40 mg) by mouth once daily. 90 tablet 1    celecoxib (CeleBREX) 200 mg capsule Take 1 capsule (200 mg) by mouth once daily. 90 capsule 1    cholecalciferol (Vitamin D-3) 25 MCG (1000 UT) capsule Take 1 capsule (25 mcg) by mouth once daily.      citalopram (CeleXA) 20 mg tablet Take 1.5 tablets (30 mg) by mouth once daily. 135 tablet 1    cyanocobalamin, vitamin B-12, 3,000 mcg capsule once every 24 hours.      levothyroxine (Synthroid, Levoxyl) 25 mcg tablet TAKE ONE TABLET BY MOUTH EVERY MORNING ON AN EMPTY STOMACH 90 tablet 0    pantoprazole (ProtoNix) 40 mg EC tablet Take 1 tablet (40 mg) by mouth 2 times a day before meals. take before breakfast and dinner 180 tablet 1    albuterol (Ventolin HFA) 90 mcg/actuation inhaler Inhale 2 puffs every 4 hours if needed for wheezing or shortness of breath. 8 g 0    methylPREDNISolone (Medrol Dospak) 4 mg tablets Take as directed on package. 21 tablet 0     No current  facility-administered medications for this visit.     F/up as scheduled in June  Continue current medications  Fasting labs in a few weeks  Will get stool studies if continues to have abdominal pain or diarrhea  Call if any new concerns or if no improvement.

## 2024-05-06 ASSESSMENT — ENCOUNTER SYMPTOMS
POLYDIPSIA: 0
SORE THROAT: 0
EYE DISCHARGE: 0
COLOR CHANGE: 0
ARTHRALGIAS: 0
WOUND: 0
DYSURIA: 0
EYE PAIN: 0
SINUS PRESSURE: 0
PALPITATIONS: 0
FLANK PAIN: 0
BLOOD IN STOOL: 0
FACIAL SWELLING: 0
NECK STIFFNESS: 0
FATIGUE: 0
SLEEP DISTURBANCE: 0
EYE ITCHING: 0
SHORTNESS OF BREATH: 0
FREQUENCY: 0
POLYPHAGIA: 0
FEVER: 0
NERVOUS/ANXIOUS: 0
UNEXPECTED WEIGHT CHANGE: 0
BRUISES/BLEEDS EASILY: 0
VOICE CHANGE: 0
APPETITE CHANGE: 0
VOMITING: 0
SINUS PAIN: 0
MYALGIAS: 0
ADENOPATHY: 0
HEMATURIA: 0
BACK PAIN: 0
DIZZINESS: 0
COUGH: 0
NAUSEA: 0
DIAPHORESIS: 0
TROUBLE SWALLOWING: 0
ACTIVITY CHANGE: 0
CHILLS: 0
JOINT SWELLING: 0
DIARRHEA: 0
EYE REDNESS: 0
ABDOMINAL PAIN: 0
RHINORRHEA: 0
CONSTIPATION: 0
AGITATION: 0
WHEEZING: 0
CHEST TIGHTNESS: 0

## 2024-05-20 ENCOUNTER — OFFICE VISIT (OUTPATIENT)
Dept: PRIMARY CARE | Facility: CLINIC | Age: 69
End: 2024-05-20
Payer: COMMERCIAL

## 2024-05-20 ENCOUNTER — HOSPITAL ENCOUNTER (OUTPATIENT)
Dept: RADIOLOGY | Facility: CLINIC | Age: 69
Discharge: HOME | End: 2024-05-20
Payer: COMMERCIAL

## 2024-05-20 VITALS
DIASTOLIC BLOOD PRESSURE: 78 MMHG | OXYGEN SATURATION: 95 % | WEIGHT: 164 LBS | HEART RATE: 87 BPM | SYSTOLIC BLOOD PRESSURE: 113 MMHG | BODY MASS INDEX: 25.69 KG/M2

## 2024-05-20 DIAGNOSIS — R06.02 SOB (SHORTNESS OF BREATH): ICD-10-CM

## 2024-05-20 DIAGNOSIS — R35.0 URINARY FREQUENCY: Primary | ICD-10-CM

## 2024-05-20 DIAGNOSIS — R06.2 WHEEZING: ICD-10-CM

## 2024-05-20 LAB
POC APPEARANCE, URINE: ABNORMAL
POC BILIRUBIN, URINE: NEGATIVE
POC BLOOD, URINE: ABNORMAL
POC COLOR, URINE: YELLOW
POC GLUCOSE, URINE: NEGATIVE MG/DL
POC KETONES, URINE: NEGATIVE MG/DL
POC LEUKOCYTES, URINE: ABNORMAL
POC NITRITE,URINE: POSITIVE
POC PH, URINE: 7 PH
POC PROTEIN, URINE: ABNORMAL MG/DL
POC SPECIFIC GRAVITY, URINE: 1.01
POC UROBILINOGEN, URINE: 0.2 EU/DL

## 2024-05-20 PROCEDURE — 1160F RVW MEDS BY RX/DR IN RCRD: CPT | Performed by: FAMILY MEDICINE

## 2024-05-20 PROCEDURE — 87186 SC STD MICRODIL/AGAR DIL: CPT

## 2024-05-20 PROCEDURE — 81003 URINALYSIS AUTO W/O SCOPE: CPT | Performed by: FAMILY MEDICINE

## 2024-05-20 PROCEDURE — 1159F MED LIST DOCD IN RCRD: CPT | Performed by: FAMILY MEDICINE

## 2024-05-20 PROCEDURE — 87086 URINE CULTURE/COLONY COUNT: CPT

## 2024-05-20 PROCEDURE — 1123F ACP DISCUSS/DSCN MKR DOCD: CPT | Performed by: FAMILY MEDICINE

## 2024-05-20 PROCEDURE — 71046 X-RAY EXAM CHEST 2 VIEWS: CPT

## 2024-05-20 PROCEDURE — 1036F TOBACCO NON-USER: CPT | Performed by: FAMILY MEDICINE

## 2024-05-20 PROCEDURE — 71046 X-RAY EXAM CHEST 2 VIEWS: CPT | Performed by: RADIOLOGY

## 2024-05-20 PROCEDURE — 99214 OFFICE O/P EST MOD 30 MIN: CPT | Performed by: FAMILY MEDICINE

## 2024-05-20 RX ORDER — BUDESONIDE AND FORMOTEROL FUMARATE DIHYDRATE 160; 4.5 UG/1; UG/1
2 AEROSOL RESPIRATORY (INHALATION)
Qty: 10.2 G | Refills: 0 | Status: SHIPPED | OUTPATIENT
Start: 2024-05-20 | End: 2025-05-20

## 2024-05-20 RX ORDER — NITROFURANTOIN 25; 75 MG/1; MG/1
100 CAPSULE ORAL 2 TIMES DAILY
Qty: 14 CAPSULE | Refills: 0 | Status: SHIPPED
Start: 2024-05-20 | End: 2024-05-28 | Stop reason: ALTCHOICE

## 2024-05-20 ASSESSMENT — ENCOUNTER SYMPTOMS
FLANK PAIN: 0
CHILLS: 0
SWEATS: 0
FREQUENCY: 1
NAUSEA: 0
HEMATURIA: 0
VOMITING: 0

## 2024-05-20 NOTE — PROGRESS NOTES
Subjective   Patient ID: Tammy Marks is a 69 y.o. female who presents for UTI (Urgency, pain with urination, cloudy urine since 5/17).  Today she is accompanied by alone.     UTI   This is a new problem. The current episode started in the past 7 days. The problem has been rapidly worsening. The quality of the pain is described as aching and burning. The pain is mild. There has been no fever. She is Not sexually active. There is No history of pyelonephritis. Associated symptoms include frequency and urgency. Pertinent negatives include no chills, discharge, flank pain, hematuria, hesitancy, nausea, possible pregnancy, sweats or vomiting. She has tried increased fluids for the symptoms. The treatment provided no relief. There is no history of catheterization, kidney stones, urinary stasis or a urological procedure.       Review of Systems   Constitutional:  Negative for chills.   Gastrointestinal:  Negative for nausea and vomiting.   Genitourinary:  Positive for frequency and urgency. Negative for flank pain, hematuria and hesitancy.       Objective   /78   Pulse 87   Wt 74.4 kg (164 lb)   LMP  (LMP Unknown)   SpO2 95%   BMI 25.69 kg/m²   BSA: 1.88 meters squared  Growth percentiles: Facility age limit for growth %angel is 20 years. Facility age limit for growth %angel is 20 years.     Physical Exam  Vitals and nursing note reviewed.   Constitutional:       General: She is not in acute distress.     Appearance: Normal appearance. She is normal weight. She is not ill-appearing.   HENT:      Head: Normocephalic.      Right Ear: Tympanic membrane, ear canal and external ear normal.      Left Ear: Tympanic membrane, ear canal and external ear normal.      Nose: Nose normal. No rhinorrhea.      Mouth/Throat:      Mouth: Mucous membranes are moist.      Pharynx: Oropharynx is clear.   Eyes:      Extraocular Movements: Extraocular movements intact.      Conjunctiva/sclera: Conjunctivae normal.      Pupils:  Pupils are equal, round, and reactive to light.   Cardiovascular:      Rate and Rhythm: Normal rate and regular rhythm.      Pulses: Normal pulses.      Heart sounds: Normal heart sounds.   Pulmonary:      Effort: Pulmonary effort is normal. No respiratory distress.      Breath sounds: No stridor. Wheezing present. No rhonchi or rales.   Chest:      Chest wall: No tenderness.   Abdominal:      General: Abdomen is flat. There is no distension.      Palpations: Abdomen is soft. There is no mass.      Tenderness: There is abdominal tenderness in the suprapubic area and left lower quadrant. There is no right CVA tenderness, left CVA tenderness, guarding or rebound. Negative signs include Isaacs's sign, Rovsing's sign, McBurney's sign, psoas sign and obturator sign.      Hernia: No hernia is present.   Musculoskeletal:         General: Normal range of motion.      Cervical back: Normal range of motion and neck supple. No rigidity or tenderness.      Right lower leg: No edema.      Left lower leg: No edema.   Lymphadenopathy:      Cervical: No cervical adenopathy.   Skin:     General: Skin is warm and dry.   Neurological:      General: No focal deficit present.      Mental Status: She is alert and oriented to person, place, and time.      Sensory: No sensory deficit.      Motor: No weakness.      Coordination: Coordination normal.   Psychiatric:         Mood and Affect: Mood normal.         Behavior: Behavior normal.         Thought Content: Thought content normal.         Judgment: Judgment normal.         Assessment/Plan   Problem List Items Addressed This Visit    None  Visit Diagnoses         Codes    Urinary frequency    -  Primary R35.0    Relevant Medications    nitrofurantoin, macrocrystal-monohydrate, (Macrobid) 100 mg capsule    Other Relevant Orders    POCT UA Automated manually resulted (Completed)    Urine Culture    Wheezing     R06.2    Relevant Medications    budesonide-formoteroL (Symbicort) 160-4.5  mcg/actuation inhaler    Other Relevant Orders    XR chest 2 views    SOB (shortness of breath)     R06.02    Relevant Orders    XR chest 2 views            Current Outpatient Medications   Medication Sig Dispense Refill    albuterol (Ventolin HFA) 90 mcg/actuation inhaler Inhale 2 puffs every 4 hours if needed for wheezing or shortness of breath. 8 g 0    atorvastatin (Lipitor) 40 mg tablet Take 1 tablet (40 mg) by mouth once daily. 90 tablet 1    celecoxib (CeleBREX) 200 mg capsule Take 1 capsule (200 mg) by mouth once daily. 90 capsule 1    cholecalciferol (Vitamin D-3) 25 MCG (1000 UT) capsule Take 1 capsule (25 mcg) by mouth once daily.      citalopram (CeleXA) 20 mg tablet Take 1.5 tablets (30 mg) by mouth once daily. 135 tablet 1    cyanocobalamin, vitamin B-12, 3,000 mcg capsule once every 24 hours.      levothyroxine (Synthroid, Levoxyl) 25 mcg tablet TAKE ONE TABLET BY MOUTH EVERY MORNING ON AN EMPTY STOMACH 90 tablet 0    pantoprazole (ProtoNix) 40 mg EC tablet Take 1 tablet (40 mg) by mouth 2 times a day before meals. take before breakfast and dinner 180 tablet 1    budesonide-formoteroL (Symbicort) 160-4.5 mcg/actuation inhaler Inhale 2 puffs 2 times a day. Rinse mouth with water after use to reduce aftertaste and incidence of candidiasis. Do not swallow. 10.2 g 0    nitrofurantoin, macrocrystal-monohydrate, (Macrobid) 100 mg capsule Take 1 capsule (100 mg) by mouth 2 times a day for 7 days. 14 capsule 0     No current facility-administered medications for this visit.     1.Start antibiotics sent in.  2. The following were reviewed with the patient  Pt should call us if she has:  Symptoms that last longer than 3 days after taking the antibiotics.  Symptoms come back after resolving.  Fever (> 100.5'F) while on antibiotics.  Pink, red, or brown color to urine after 3 days.  Ways to prevent future infections:  Urinate after having vaginal intercourse    Start Symbicort and call if no improvement or if  worse  F/U in 1 week for wheezing and coughing

## 2024-05-20 NOTE — PATIENT INSTRUCTIONS
1.Start antibiotics sent in.  2. The following were reviewed with the patient  Pt should call us if she has:  Symptoms that last longer than 3 days after taking the antibiotics.  Symptoms come back after resolving.  Fever (> 100.5'F) while on antibiotics.  Pink, red, or brown color to urine after 3 days.  Ways to prevent future infections:  Urinate after having vaginal intercourse    Start Symbicort and call if no improvement or if worse  F/U in 1 week for wheezing and coughing  Current Outpatient Medications   Medication Sig Dispense Refill    albuterol (Ventolin HFA) 90 mcg/actuation inhaler Inhale 2 puffs every 4 hours if needed for wheezing or shortness of breath. 8 g 0    atorvastatin (Lipitor) 40 mg tablet Take 1 tablet (40 mg) by mouth once daily. 90 tablet 1    celecoxib (CeleBREX) 200 mg capsule Take 1 capsule (200 mg) by mouth once daily. 90 capsule 1    cholecalciferol (Vitamin D-3) 25 MCG (1000 UT) capsule Take 1 capsule (25 mcg) by mouth once daily.      citalopram (CeleXA) 20 mg tablet Take 1.5 tablets (30 mg) by mouth once daily. 135 tablet 1    cyanocobalamin, vitamin B-12, 3,000 mcg capsule once every 24 hours.      levothyroxine (Synthroid, Levoxyl) 25 mcg tablet TAKE ONE TABLET BY MOUTH EVERY MORNING ON AN EMPTY STOMACH 90 tablet 0    pantoprazole (ProtoNix) 40 mg EC tablet Take 1 tablet (40 mg) by mouth 2 times a day before meals. take before breakfast and dinner 180 tablet 1    budesonide-formoteroL (Symbicort) 160-4.5 mcg/actuation inhaler Inhale 2 puffs 2 times a day. Rinse mouth with water after use to reduce aftertaste and incidence of candidiasis. Do not swallow. 10.2 g 0    nitrofurantoin, macrocrystal-monohydrate, (Macrobid) 100 mg capsule Take 1 capsule (100 mg) by mouth 2 times a day for 7 days. 14 capsule 0     No current facility-administered medications for this visit.

## 2024-05-23 LAB — BACTERIA UR CULT: ABNORMAL

## 2024-05-23 NOTE — RESULT ENCOUNTER NOTE
Pharm fax requesting furosemide 40mg tablets   Urine culture shows E. coli and is sensitive to Macrobid.  Hope she is feeling better

## 2024-05-28 ENCOUNTER — LAB (OUTPATIENT)
Dept: LAB | Facility: LAB | Age: 69
End: 2024-05-28
Payer: COMMERCIAL

## 2024-05-28 ENCOUNTER — OFFICE VISIT (OUTPATIENT)
Dept: PRIMARY CARE | Facility: CLINIC | Age: 69
End: 2024-05-28
Payer: COMMERCIAL

## 2024-05-28 VITALS
HEART RATE: 77 BPM | BODY MASS INDEX: 25.92 KG/M2 | SYSTOLIC BLOOD PRESSURE: 106 MMHG | DIASTOLIC BLOOD PRESSURE: 73 MMHG | WEIGHT: 165.5 LBS | OXYGEN SATURATION: 95 %

## 2024-05-28 DIAGNOSIS — Z12.31 ENCOUNTER FOR SCREENING MAMMOGRAM FOR MALIGNANT NEOPLASM OF BREAST: ICD-10-CM

## 2024-05-28 DIAGNOSIS — R06.02 SOB (SHORTNESS OF BREATH): ICD-10-CM

## 2024-05-28 DIAGNOSIS — E78.2 MIXED HYPERLIPIDEMIA: ICD-10-CM

## 2024-05-28 DIAGNOSIS — Z13.6 SCREENING FOR CARDIOVASCULAR CONDITION: ICD-10-CM

## 2024-05-28 DIAGNOSIS — E53.8 VITAMIN B 12 DEFICIENCY: ICD-10-CM

## 2024-05-28 DIAGNOSIS — M85.80 OSTEOPENIA, UNSPECIFIED LOCATION: ICD-10-CM

## 2024-05-28 DIAGNOSIS — R06.2 WHEEZING: Primary | ICD-10-CM

## 2024-05-28 DIAGNOSIS — Z13.1 DIABETES MELLITUS SCREENING: ICD-10-CM

## 2024-05-28 DIAGNOSIS — E03.9 ACQUIRED HYPOTHYROIDISM: ICD-10-CM

## 2024-05-28 DIAGNOSIS — R73.09 ABNORMAL GLUCOSE: ICD-10-CM

## 2024-05-28 DIAGNOSIS — F33.9 CHRONIC MAJOR DEPRESSIVE DISORDER, RECURRENT EPISODE (CMS-HCC): ICD-10-CM

## 2024-05-28 PROCEDURE — 84443 ASSAY THYROID STIM HORMONE: CPT

## 2024-05-28 PROCEDURE — 1159F MED LIST DOCD IN RCRD: CPT | Performed by: FAMILY MEDICINE

## 2024-05-28 PROCEDURE — 36415 COLL VENOUS BLD VENIPUNCTURE: CPT

## 2024-05-28 PROCEDURE — 83735 ASSAY OF MAGNESIUM: CPT

## 2024-05-28 PROCEDURE — 82607 VITAMIN B-12: CPT

## 2024-05-28 PROCEDURE — 80053 COMPREHEN METABOLIC PANEL: CPT

## 2024-05-28 PROCEDURE — 83036 HEMOGLOBIN GLYCOSYLATED A1C: CPT

## 2024-05-28 PROCEDURE — 85025 COMPLETE CBC W/AUTO DIFF WBC: CPT

## 2024-05-28 PROCEDURE — 99214 OFFICE O/P EST MOD 30 MIN: CPT | Performed by: FAMILY MEDICINE

## 2024-05-28 PROCEDURE — 1160F RVW MEDS BY RX/DR IN RCRD: CPT | Performed by: FAMILY MEDICINE

## 2024-05-28 PROCEDURE — 1123F ACP DISCUSS/DSCN MKR DOCD: CPT | Performed by: FAMILY MEDICINE

## 2024-05-28 PROCEDURE — 80061 LIPID PANEL: CPT

## 2024-05-28 PROCEDURE — 1036F TOBACCO NON-USER: CPT | Performed by: FAMILY MEDICINE

## 2024-05-28 ASSESSMENT — ENCOUNTER SYMPTOMS
ABDOMINAL PAIN: 0
BRUISES/BLEEDS EASILY: 0
ACTIVITY CHANGE: 0
WOUND: 0
SORE THROAT: 0
RHINORRHEA: 0
DYSURIA: 0
AGITATION: 0
SLEEP DISTURBANCE: 0
MYALGIAS: 0
DIARRHEA: 0
ADENOPATHY: 0
DIZZINESS: 0
DIAPHORESIS: 0
NECK STIFFNESS: 0
FLANK PAIN: 0
POLYPHAGIA: 0
BACK PAIN: 0
WHEEZING: 0
TROUBLE SWALLOWING: 0
SHORTNESS OF BREATH: 0
ARTHRALGIAS: 0
EYE REDNESS: 0
COUGH: 0
FATIGUE: 0
FREQUENCY: 0
APPETITE CHANGE: 0
VOMITING: 0
SINUS PRESSURE: 0
EYE ITCHING: 0
HEMATURIA: 0
JOINT SWELLING: 0
FEVER: 0
COLOR CHANGE: 0
UNEXPECTED WEIGHT CHANGE: 0
VOICE CHANGE: 0
SINUS PAIN: 0
POLYDIPSIA: 0
EYE DISCHARGE: 0
BLOOD IN STOOL: 0
CHILLS: 0
PALPITATIONS: 0
NERVOUS/ANXIOUS: 0
FACIAL SWELLING: 0
CHEST TIGHTNESS: 0
CONSTIPATION: 0
NAUSEA: 0
EYE PAIN: 0

## 2024-05-28 NOTE — PROGRESS NOTES
Subjective   Patient ID: Tammy Marks is a 69 y.o. female who presents for Follow-up (Wheezing).  Today she is accompanied by alone.     HPI  Here for f/U for wheezing and is feeling much better  No other concerns  Symbicort is helping and she is breathing better.    Review of Systems   Constitutional:  Negative for activity change, appetite change, chills, diaphoresis, fatigue, fever and unexpected weight change.   HENT:  Negative for congestion, dental problem, drooling, ear discharge, ear pain, facial swelling, hearing loss, nosebleeds, postnasal drip, rhinorrhea, sinus pressure, sinus pain, sneezing, sore throat, tinnitus, trouble swallowing and voice change.    Eyes:  Negative for pain, discharge, redness, itching and visual disturbance.   Respiratory:  Negative for cough, chest tightness, shortness of breath and wheezing.    Cardiovascular:  Negative for chest pain, palpitations and leg swelling.   Gastrointestinal:  Negative for abdominal pain, blood in stool, constipation, diarrhea, nausea and vomiting.   Endocrine: Negative for cold intolerance, heat intolerance, polydipsia, polyphagia and polyuria.   Genitourinary:  Negative for decreased urine volume, dysuria, flank pain, frequency, hematuria and urgency.   Musculoskeletal:  Negative for arthralgias, back pain, gait problem, joint swelling, myalgias and neck stiffness.   Skin:  Negative for color change, pallor, rash and wound.   Neurological:  Negative for dizziness.   Hematological:  Negative for adenopathy. Does not bruise/bleed easily.   Psychiatric/Behavioral:  Negative for agitation, behavioral problems and sleep disturbance. The patient is not nervous/anxious.        Objective   /73   Pulse 77   Wt 75.1 kg (165 lb 8 oz)   LMP  (LMP Unknown)   SpO2 95%   BMI 25.92 kg/m²   BSA: 1.88 meters squared  Growth percentiles: Facility age limit for growth %angel is 20 years. Facility age limit for growth %angel is 20 years.     Physical  Exam  Vitals and nursing note reviewed.   Constitutional:       General: She is not in acute distress.     Appearance: Normal appearance. She is normal weight. She is not ill-appearing.   HENT:      Head: Normocephalic.      Right Ear: Tympanic membrane, ear canal and external ear normal.      Left Ear: Tympanic membrane, ear canal and external ear normal.      Nose: Nose normal. No rhinorrhea.      Mouth/Throat:      Mouth: Mucous membranes are moist.      Pharynx: Oropharynx is clear.   Eyes:      Extraocular Movements: Extraocular movements intact.      Conjunctiva/sclera: Conjunctivae normal.      Pupils: Pupils are equal, round, and reactive to light.   Cardiovascular:      Rate and Rhythm: Normal rate and regular rhythm.      Pulses: Normal pulses.      Heart sounds: Normal heart sounds.   Pulmonary:      Effort: Pulmonary effort is normal. No respiratory distress.      Breath sounds: Normal breath sounds. No wheezing.   Musculoskeletal:         General: Normal range of motion.      Cervical back: Normal range of motion and neck supple. No rigidity or tenderness.      Right lower leg: No edema.      Left lower leg: No edema.   Lymphadenopathy:      Cervical: No cervical adenopathy.   Skin:     General: Skin is warm and dry.   Neurological:      General: No focal deficit present.      Mental Status: She is alert and oriented to person, place, and time.      Sensory: No sensory deficit.      Motor: No weakness.      Coordination: Coordination normal.   Psychiatric:         Mood and Affect: Mood normal.         Behavior: Behavior normal.         Thought Content: Thought content normal.         Judgment: Judgment normal.         Assessment/Plan   Problem List Items Addressed This Visit    None  Visit Diagnoses         Codes    Wheezing    -  Primary R06.2    Relevant Orders    Complete Pulmonary Function Test Pre/Post Bronchodialator (Spirometry Pre/Post/DLCO/Lung Volumes)    SOB (shortness of breath)     R06.02     Relevant Orders    Complete Pulmonary Function Test Pre/Post Bronchodialator (Spirometry Pre/Post/DLCO/Lung Volumes)    Encounter for screening mammogram for malignant neoplasm of breast     Z12.31    Relevant Orders    BI mammo bilateral screening tomosynthesis          Current Outpatient Medications   Medication Sig Dispense Refill    albuterol (Ventolin HFA) 90 mcg/actuation inhaler Inhale 2 puffs every 4 hours if needed for wheezing or shortness of breath. 8 g 0    atorvastatin (Lipitor) 40 mg tablet Take 1 tablet (40 mg) by mouth once daily. 90 tablet 1    budesonide-formoteroL (Symbicort) 160-4.5 mcg/actuation inhaler Inhale 2 puffs 2 times a day. Rinse mouth with water after use to reduce aftertaste and incidence of candidiasis. Do not swallow. 10.2 g 0    celecoxib (CeleBREX) 200 mg capsule Take 1 capsule (200 mg) by mouth once daily. 90 capsule 1    cholecalciferol (Vitamin D-3) 25 MCG (1000 UT) capsule Take 1 capsule (25 mcg) by mouth once daily.      citalopram (CeleXA) 20 mg tablet Take 1.5 tablets (30 mg) by mouth once daily. 135 tablet 1    cyanocobalamin, vitamin B-12, 3,000 mcg capsule once every 24 hours.      levothyroxine (Synthroid, Levoxyl) 25 mcg tablet TAKE ONE TABLET BY MOUTH EVERY MORNING ON AN EMPTY STOMACH 90 tablet 0    pantoprazole (ProtoNix) 40 mg EC tablet Take 1 tablet (40 mg) by mouth 2 times a day before meals. take before breakfast and dinner 180 tablet 1     No current facility-administered medications for this visit.     PFT to check for COPD/Asthma  Stable on current medications  Continue meds and exercise.     F/U as scheduled

## 2024-05-29 ENCOUNTER — SOCIAL WORK (OUTPATIENT)
Dept: PRIMARY CARE | Facility: CLINIC | Age: 69
End: 2024-05-29
Payer: COMMERCIAL

## 2024-05-29 DIAGNOSIS — F33.9 CHRONIC MAJOR DEPRESSIVE DISORDER, RECURRENT EPISODE (CMS-HCC): ICD-10-CM

## 2024-05-29 LAB
ALBUMIN SERPL BCP-MCNC: 4.6 G/DL (ref 3.4–5)
ALP SERPL-CCNC: 124 U/L (ref 33–136)
ALT SERPL W P-5'-P-CCNC: 26 U/L (ref 7–45)
ANION GAP SERPL CALC-SCNC: 17 MMOL/L (ref 10–20)
AST SERPL W P-5'-P-CCNC: 16 U/L (ref 9–39)
BASOPHILS # BLD AUTO: 0.05 X10*3/UL (ref 0–0.1)
BASOPHILS NFR BLD AUTO: 0.8 %
BILIRUB SERPL-MCNC: 0.4 MG/DL (ref 0–1.2)
BUN SERPL-MCNC: 14 MG/DL (ref 6–23)
CALCIUM SERPL-MCNC: 9.6 MG/DL (ref 8.6–10.6)
CHLORIDE SERPL-SCNC: 105 MMOL/L (ref 98–107)
CHOLEST SERPL-MCNC: 202 MG/DL (ref 0–199)
CHOLESTEROL/HDL RATIO: 3.6
CO2 SERPL-SCNC: 25 MMOL/L (ref 21–32)
CREAT SERPL-MCNC: 0.7 MG/DL (ref 0.5–1.05)
EGFRCR SERPLBLD CKD-EPI 2021: >90 ML/MIN/1.73M*2
EOSINOPHIL # BLD AUTO: 0.37 X10*3/UL (ref 0–0.7)
EOSINOPHIL NFR BLD AUTO: 5.6 %
ERYTHROCYTE [DISTWIDTH] IN BLOOD BY AUTOMATED COUNT: 13 % (ref 11.5–14.5)
EST. AVERAGE GLUCOSE BLD GHB EST-MCNC: 114 MG/DL
GLUCOSE SERPL-MCNC: 107 MG/DL (ref 74–99)
HBA1C MFR BLD: 5.6 %
HCT VFR BLD AUTO: 43.9 % (ref 36–46)
HDLC SERPL-MCNC: 56.3 MG/DL
HGB BLD-MCNC: 14.3 G/DL (ref 12–16)
IMM GRANULOCYTES # BLD AUTO: 0.04 X10*3/UL (ref 0–0.7)
IMM GRANULOCYTES NFR BLD AUTO: 0.6 % (ref 0–0.9)
LDLC SERPL CALC-MCNC: 107 MG/DL
LYMPHOCYTES # BLD AUTO: 1.44 X10*3/UL (ref 1.2–4.8)
LYMPHOCYTES NFR BLD AUTO: 21.8 %
MAGNESIUM SERPL-MCNC: 2.55 MG/DL (ref 1.6–2.4)
MCH RBC QN AUTO: 31.8 PG (ref 26–34)
MCHC RBC AUTO-ENTMCNC: 32.6 G/DL (ref 32–36)
MCV RBC AUTO: 98 FL (ref 80–100)
MONOCYTES # BLD AUTO: 0.49 X10*3/UL (ref 0.1–1)
MONOCYTES NFR BLD AUTO: 7.4 %
NEUTROPHILS # BLD AUTO: 4.22 X10*3/UL (ref 1.2–7.7)
NEUTROPHILS NFR BLD AUTO: 63.8 %
NON HDL CHOLESTEROL: 146 MG/DL (ref 0–149)
NRBC BLD-RTO: 0 /100 WBCS (ref 0–0)
PLATELET # BLD AUTO: 360 X10*3/UL (ref 150–450)
POTASSIUM SERPL-SCNC: 4.3 MMOL/L (ref 3.5–5.3)
PROT SERPL-MCNC: 7.1 G/DL (ref 6.4–8.2)
RBC # BLD AUTO: 4.49 X10*6/UL (ref 4–5.2)
SODIUM SERPL-SCNC: 143 MMOL/L (ref 136–145)
TRIGL SERPL-MCNC: 194 MG/DL (ref 0–149)
TSH SERPL-ACNC: 2.3 MIU/L (ref 0.44–3.98)
VIT B12 SERPL-MCNC: 919 PG/ML (ref 211–911)
VLDL: 39 MG/DL (ref 0–40)
WBC # BLD AUTO: 6.6 X10*3/UL (ref 4.4–11.3)

## 2024-05-29 ASSESSMENT — PATIENT HEALTH QUESTIONNAIRE - PHQ9
8. MOVING OR SPEAKING SO SLOWLY THAT OTHER PEOPLE COULD HAVE NOTICED. OR THE OPPOSITE, BEING SO FIGETY OR RESTLESS THAT YOU HAVE BEEN MOVING AROUND A LOT MORE THAN USUAL: NOT AT ALL
7. TROUBLE CONCENTRATING ON THINGS, SUCH AS READING THE NEWSPAPER OR WATCHING TELEVISION: NOT AT ALL
SUM OF ALL RESPONSES TO PHQ QUESTIONS 1-9: 10
9. THOUGHTS THAT YOU WOULD BE BETTER OFF DEAD, OR OF HURTING YOURSELF: NOT AT ALL
3. TROUBLE FALLING OR STAYING ASLEEP: SEVERAL DAYS
5. POOR APPETITE OR OVEREATING: NOT AT ALL
SUM OF ALL RESPONSES TO PHQ9 QUESTIONS 1 & 2: 4
10. IF YOU CHECKED OFF ANY PROBLEMS, HOW DIFFICULT HAVE THESE PROBLEMS MADE IT FOR YOU TO DO YOUR WORK, TAKE CARE OF THINGS AT HOME, OR GET ALONG WITH OTHER PEOPLE: SOMEWHAT DIFFICULT
4. FEELING TIRED OR HAVING LITTLE ENERGY: NEARLY EVERY DAY
1. LITTLE INTEREST OR PLEASURE IN DOING THINGS: MORE THAN HALF THE DAYS
6. FEELING BAD ABOUT YOURSELF - OR THAT YOU ARE A FAILURE OR HAVE LET YOURSELF OR YOUR FAMILY DOWN: MORE THAN HALF THE DAYS
2. FEELING DOWN, DEPRESSED OR HOPELESS: MORE THAN HALF THE DAYS

## 2024-05-29 ASSESSMENT — ANXIETY QUESTIONNAIRES
2. NOT BEING ABLE TO STOP OR CONTROL WORRYING: MORE THAN HALF THE DAYS
3. WORRYING TOO MUCH ABOUT DIFFERENT THINGS: MORE THAN HALF THE DAYS
IF YOU CHECKED OFF ANY PROBLEMS ON THIS QUESTIONNAIRE, HOW DIFFICULT HAVE THESE PROBLEMS MADE IT FOR YOU TO DO YOUR WORK, TAKE CARE OF THINGS AT HOME, OR GET ALONG WITH OTHER PEOPLE: SOMEWHAT DIFFICULT
6. BECOMING EASILY ANNOYED OR IRRITABLE: MORE THAN HALF THE DAYS
4. TROUBLE RELAXING: MORE THAN HALF THE DAYS
7. FEELING AFRAID AS IF SOMETHING AWFUL MIGHT HAPPEN: NOT AT ALL
5. BEING SO RESTLESS THAT IT IS HARD TO SIT STILL: SEVERAL DAYS
1. FEELING NERVOUS, ANXIOUS, OR ON EDGE: NEARLY EVERY DAY
GAD7 TOTAL SCORE: 12

## 2024-05-29 NOTE — PROGRESS NOTES
Collaborative Care (CoCM) Initial Assessment    Session Time  Start: 1:05 pm  End: 2:25 pm     Collaborative Care program information (including case discussion with psychiatry, involvement of [facility] and billing when applicable) was provided and discussed with the patient. Patient Indicated understanding and agreed to proceed.   Confirm: Yes    Patient Health Questionnaire-9 Score: 10 (5/29/2024  2:45 PM)  FALLON-7 Total Score: 12 (5/29/2024  2:41 PM)        Reason for Visit / Chief Complaint  Chief Complaint   Patient presents with    Depression       Accompanied by: Self  Guardian Status: Self  Caregiver Status: Does not have a caregiver    Review of Symptoms    Sleep   Average Hours Sleep in/Night: 8  Prepares Self for Sleep at Time: 11 pm  Usual Wake up Time: typically about 8  Sleep Symptoms:  will sometimes wake up in the middle of the night.  Sleep Hygiene: good sleep hygiene    Mood   Symptom Onset/Duration:  since January    Current Sx: little interest/pleasure doing things, feeling down, feeling tired/little energy, and feeling bad about self  Triggers: situation(s)  Past Sx:     Anxiety   Symptom Onset/Duration: Last 4-6 months  Current Sx: feeling nervous/anxious/on edge, difficulty stopping/controlling worry, worrying too much, trouble relaxing, feeling fidgety/restless, and easily annoyed/irritable  Panic / Somatic Sx: none  Triggers: situation(s)  Past Sx:     Self-Esteem / Self-Image   Self Esteem Rating (1-10 Scale, 10 being high): 6  Self-Esteem / Self Image Sx: able to identify strength    Appetite   Description of Overall Appetite: average appetite  Eating Behaviors: eats balanced meals  Concerns with appetite: none, denied    Anger / Irritability  Symptoms of Anger / Irritability: easily agitated; as of late noticed a difference.     Communication / Self Expression  Communication Style & Concerns: able to express self/emotions    Trauma    Symptoms Onset/Duration:   Traumatic Experiences:   relationship with mother  Current Symptoms Related to Traumatic Experience:   Triggers:     Grief / Loss / Adjustment   Symptom Onset/Duration:   Current Sx:   Factors of Grief / Loss / Adjustment:     Hallucinations / Delusions   Hallucinations & Delusions Experienced: none, denied    Learning Concerns / Memory   Learning Concerns & Sx: none, denied  Memory Concerns & Sx: none, denied    Functional impairment   Impacting ADL's: no impairment   Impacting IADL's: No impairment  Impacting Ability : No impairment    Associated Medical Concerns   Potential Associated Factors: cancer      Comprehensive Behavioral Health History     Medications  Current Mental Health Medications:   Celexa / citalopram; Dose: 30 mg; Side effects: None, denied    Past Mental Health Medications:       Concerns / challenges / barriers with taking medications? No concerns    Open to medication recommendations from consulting psychiatrist? Yes    Do you ever forget to take your medication? No  If yes, how often?     Mental Health Treatment History  Mental Health Treatment: individual therapy  Reason/When/Where/Outcome: positive    Risk History  Suicidal Thoughts/Method/Intent/Plan: passive suicidal thoughts  Suicide Attempts/Preparations: None, denied  Number of Suicide Attempts: 0  Access to Firearms/Lethal Means: stored in locked cabinet  Non-Suicidal Self Injury: None, denied  Last Smoot Risk Score:    Protective Factors: good protective factors    Violence: None, denied  Homicidal Thoughts/Method/Plan/Intent: None, denied  Homicidal Attempts/Preparations: None, denied  Number of Attempts: 0      Substance Use History    Substances    Social History     Substance and Sexual Activity   Alcohol Use Yes    Alcohol/week: 5.0 standard drinks of alcohol    Types: 5 Standard drinks or equivalent per week    Comment: social     Social History     Substance and Sexual Activity   Drug Use Never       Substance Current Use                        Addiction Treatment     Types of Addiction Treatment:   Currently Sober?     Status/Length of Sobriety:     Family History    Mental Health / Conditions    Family Member Condition / Diagnosis Medications / Side Effects   Sister Bipolar                     Substance Use    Family Member Substance Current Use                           History of Suicide    Family Member Details               Social History    Housing   Living Situation: lives with , son, daughter in law, and grandkids  Safe Housing Conditions / Feels Safe in Home: Yes    Employment  Current Employment:  retired  Current Concerns/Challenges: No    Income   Current Concerns/Challenges: No  Receive Benefits/Assistance: No    Education   Status / Level of Education: Associates degree    Legal   Legal Considerations: None, denied    Relationships   S/O:  31 years  Parents/Guardian: strained relationship with mother  Siblings: quality relationships  Friends: quality relationship  Other:        Active Duty?   Are you a ?   Branch Area:   Were you in combat?   Discharge Status:   Do you receive VA Benefits:     Sexuality / Gender   Concerns with Sexuality/Gender: None, denied  Sexual Orientation: heterosexual    Preferred Gender Pronouns / Identity: She/her/hers    Transportation   Transportation Concerns: active 's license    Baptist/ Spirituality   Are you Catholic or Spiritual: yes  Baptist / Practice:   Spiritual Practice:  believes  in god    Coping / Strengths / Supports   Coping:   reading and jewelry  Strengths:  problem solving, helping others.   Supports:  sister, friends      Abuse History  Physical Abuse: No  Sexual Abuse: No  Verbal / Emotional Abuse / Bullying (+Cyber): No   Financial Abuse: No  Domestic Violence: No    Assessment Summary  / Plan    Assessment Summary:  What do you want to work on/get out of collaborative care?   Not feel so responsible for others, takes time for self.     Patient is being  referred by Dr. Wiley for her symptoms of depression. Patient was engaging and cooperative throughout the assessment. Patient's initial PHQ 9 is 10 and her FALLON 7 score is 12.    Patient reports no real issues with her sleep, she does report waking up sometimes in the middle of the night. Patient reports the changes in her mood have been ongoing since January. Patient reports the following symptoms, little interest/pleasure doing things, feeling down, feeling tired/little energy, and feeling bad about self. Patient reports symptoms of anxiety that been ongoing for the past few months. Patient reports the following symptoms, feeling nervous/anxious/on edge, difficulty stopping/controlling worry, worrying too much, trouble relaxing, feeling fidgety/restless, and easily annoyed/irritable. Patient noticed her irritability as of late.     Patient reports having passive suicidal ideations in the past but no history of intent, plan, or method and no active suicidal. Patient denies hallucinations, delusions, they do have access to weapons but they are locked up. Patient denies history of inpatient hospitalizations, but has been in therapy before and is currently taking Celexa 30mg.    Patient denies history of abuse including no history of physical, sexual, verbal/emotional, and no history of domestic violence. Patient reported when discussing trauma she has a strained relationship with her mother.     Patient is retired and lives at home with her , and her son, daughter in law, and their 2 kids which then turned into 4 which was a surprise all moved in end of last year due to some housing issues. The house is very cramped and there are stressors with the relationships and engagement in the house hold since her son moved in. Patient was also recently diagnosed with cancer and starts chemotherapy in a week and a half and is worried what is going to happen as she runs the household and will have to take a step back  due to treatment over the next couple months. There has also been stressors in regards to her 's health over this year as well and is just feeling like something is always wrong medically between the both of them.  Patient has a good coping outlet in reading, she also enjoys crafts but has not access to it currently as the basement has been taken over and patient would like to have access to it again. Patient's support system includes her sister and friends.    Patient's goals include no feeling so responsible for others and taking the time to care for herself.          Plan:   bi-weekly    Follow up in 16 days (on 6/14/2024).    Provisional Findings / Impressions  Primary: depression    Secondary:     Goals

## 2024-05-29 NOTE — RESULT ENCOUNTER NOTE
Results are normal except cholesterol would like her to have a type II diet plan.  Glucose is elevated but A1c is normal continue diet control.  Magnesium is elevated. Needs to look at her diet or supplements if she is taking any magnesium.

## 2024-05-31 ENCOUNTER — DOCUMENTATION (OUTPATIENT)
Dept: PRIMARY CARE | Facility: CLINIC | Age: 69
End: 2024-05-31
Payer: COMMERCIAL

## 2024-05-31 ENCOUNTER — DOCUMENTATION (OUTPATIENT)
Dept: BEHAVIORAL HEALTH | Facility: CLINIC | Age: 69
End: 2024-05-31
Payer: COMMERCIAL

## 2024-05-31 DIAGNOSIS — F33.9 CHRONIC MAJOR DEPRESSIVE DISORDER, RECURRENT EPISODE (CMS-HCC): Primary | ICD-10-CM

## 2024-05-31 PROCEDURE — 99494 1ST/SBSQ PSYC COLLAB CARE: CPT | Performed by: FAMILY MEDICINE

## 2024-05-31 PROCEDURE — 99492 1ST PSYC COLLAB CARE MGMT: CPT | Performed by: FAMILY MEDICINE

## 2024-05-31 NOTE — PROGRESS NOTES
[facility] Psychiatry Consult Note     Tammy Marks is a 69 y.o., referred to Collaborative Care for symptoms of depression and anxiety. I have reviewed the patient with the behavioral health manager (M) and reviewed the patient's electronic record.    Recommendations:   Tammy is currently on Celexa 30mg po daily, recommend decrease to Celexa 20mg po daily as FDA recommendation to not exceed Celexa 20mg for 60+ age range with elderly more prone to SSRI induced hyponatremia and risk for Qtc prolongation.     If the decrease in Celexa worsens mood/anxiety, recommend cross titration to Zoloft such as:   Week 1-2  Decrease to Celexa 10mg po daily   Start Zoloft 25mg po daily     Week 3  STOP Celexa  Increase to Zoloft 50mg po daily     For Zoloft, recommend increasing by 25mg increments as tolerated, every 3-4 weeks to continue targeting anxiety symptoms. Anxiety typical responds to medium-high doses of an SSRI. Discuss SE including but not limited to dry mouth, fatigue, dizziness, n/d, somnolence, decreased libido.   Not to exceed 150-200mg po daily for geriatric population. As she was on a medium-high dose of Celexa, Tammy would likely benefit in the 100-200mg dose range of Zoloft.    While engaged in Collaborative Care services, Tammy has goals of working with [facility] on boundary setting with family, working on her responsibility level for others and taking time for herself.     Collateral with [facility]:   -Stressors include prolonged stay of family members in home from a planned temporary stay that started Winter 3968-4130 (6 additional people, 2 adults, 2 children, 2 infants).  -Will soon start colon cancer treatment  -She has had 2 prior therapists.   -Guns are locked up in the home.   -Denies prior suicide attempts, denies prior inpatient psychiatry admission    Patient Health Questionnaire-9 Score: 10 (5/29/2024  2:45 PM)  FALLON-7 Total Score: 12 (5/29/2024  2:41 PM)    The above treatment considerations and suggestions are  based on consultations with the patient's care manager and a review of information available in the electronic medical record. I have not personally examined the patient. All recommendations should be implemented with consideration of the patient's relevant prior history and current clinical status. Please feel free to call me with any questions about the care of this patient. I can easily be reached through [email or web address].

## 2024-05-31 NOTE — Clinical Note
Hi Dr. Wiley,   Tammy needs to decrease her Celexa dose, she is over the FDA recommended dose for age 60+ and needs to decrease to 20mg. In my note for today, I added in an option for if she needs to cross titrate to a new medication if the lower Celexa dose worsens her mood/anxiety.

## 2024-06-13 ENCOUNTER — APPOINTMENT (OUTPATIENT)
Dept: PRIMARY CARE | Facility: CLINIC | Age: 69
End: 2024-06-13
Payer: COMMERCIAL

## 2024-06-13 VITALS
HEART RATE: 97 BPM | RESPIRATION RATE: 16 BRPM | SYSTOLIC BLOOD PRESSURE: 112 MMHG | OXYGEN SATURATION: 93 % | BODY MASS INDEX: 25.69 KG/M2 | TEMPERATURE: 97.9 F | DIASTOLIC BLOOD PRESSURE: 74 MMHG | WEIGHT: 164 LBS

## 2024-06-13 DIAGNOSIS — F33.9 CHRONIC MAJOR DEPRESSIVE DISORDER, RECURRENT EPISODE (CMS-HCC): ICD-10-CM

## 2024-06-13 DIAGNOSIS — K21.00 GASTROESOPHAGEAL REFLUX DISEASE WITH ESOPHAGITIS WITHOUT HEMORRHAGE: ICD-10-CM

## 2024-06-13 DIAGNOSIS — E78.2 MIXED HYPERLIPIDEMIA: ICD-10-CM

## 2024-06-13 DIAGNOSIS — M85.80 OSTEOPENIA, UNSPECIFIED LOCATION: ICD-10-CM

## 2024-06-13 DIAGNOSIS — C18.7 MALIGNANT NEOPLASM OF SIGMOID COLON (MULTI): ICD-10-CM

## 2024-06-13 DIAGNOSIS — E03.9 ACQUIRED HYPOTHYROIDISM: ICD-10-CM

## 2024-06-13 DIAGNOSIS — J42 CHRONIC BRONCHITIS WITH WHEEZING (MULTI): Primary | ICD-10-CM

## 2024-06-13 PROCEDURE — 99215 OFFICE O/P EST HI 40 MIN: CPT | Performed by: FAMILY MEDICINE

## 2024-06-13 PROCEDURE — 1123F ACP DISCUSS/DSCN MKR DOCD: CPT | Performed by: FAMILY MEDICINE

## 2024-06-13 PROCEDURE — 1036F TOBACCO NON-USER: CPT | Performed by: FAMILY MEDICINE

## 2024-06-13 PROCEDURE — 1160F RVW MEDS BY RX/DR IN RCRD: CPT | Performed by: FAMILY MEDICINE

## 2024-06-13 PROCEDURE — 1159F MED LIST DOCD IN RCRD: CPT | Performed by: FAMILY MEDICINE

## 2024-06-13 RX ORDER — LEVOTHYROXINE SODIUM 25 UG/1
25 TABLET ORAL
Qty: 90 TABLET | Refills: 1 | Status: SHIPPED | OUTPATIENT
Start: 2024-06-13

## 2024-06-13 RX ORDER — PANTOPRAZOLE SODIUM 40 MG/1
40 TABLET, DELAYED RELEASE ORAL
Qty: 180 TABLET | Refills: 1 | Status: SHIPPED | OUTPATIENT
Start: 2024-06-13

## 2024-06-13 RX ORDER — CELECOXIB 200 MG/1
200 CAPSULE ORAL DAILY
Qty: 90 CAPSULE | Refills: 1 | Status: SHIPPED | OUTPATIENT
Start: 2024-06-13

## 2024-06-13 RX ORDER — CITALOPRAM 20 MG/1
30 TABLET, FILM COATED ORAL DAILY
Qty: 135 TABLET | Refills: 1 | Status: SHIPPED | OUTPATIENT
Start: 2024-06-13

## 2024-06-13 RX ORDER — BUDESONIDE AND FORMOTEROL FUMARATE DIHYDRATE 160; 4.5 UG/1; UG/1
2 AEROSOL RESPIRATORY (INHALATION)
Qty: 10.2 G | Refills: 3 | Status: SHIPPED | OUTPATIENT
Start: 2024-06-13 | End: 2025-06-13

## 2024-06-13 RX ORDER — ATORVASTATIN CALCIUM 40 MG/1
40 TABLET, FILM COATED ORAL DAILY
Qty: 90 TABLET | Refills: 1 | Status: SHIPPED | OUTPATIENT
Start: 2024-06-13

## 2024-06-13 ASSESSMENT — ENCOUNTER SYMPTOMS
CHEST TIGHTNESS: 0
PALPITATIONS: 0
HEMATURIA: 0
UNEXPECTED WEIGHT CHANGE: 0
SHORTNESS OF BREATH: 0
COLOR CHANGE: 0
VOICE CHANGE: 0
EYE PAIN: 0
TROUBLE SWALLOWING: 0
BACK PAIN: 0
ABDOMINAL PAIN: 1
SINUS PRESSURE: 0
FATIGUE: 0
WHEEZING: 0
RHINORRHEA: 0
CONSTIPATION: 0
FEVER: 0
VOMITING: 0
BRUISES/BLEEDS EASILY: 0
COUGH: 0
NAUSEA: 0
POLYPHAGIA: 0
DIARRHEA: 0
NERVOUS/ANXIOUS: 1
SINUS PAIN: 0
SLEEP DISTURBANCE: 0
AGITATION: 0
DYSURIA: 0
EYE DISCHARGE: 0
FACIAL SWELLING: 0
CHILLS: 0
FLANK PAIN: 0
EYE REDNESS: 0
ARTHRALGIAS: 0
MYALGIAS: 0
WOUND: 0
ACTIVITY CHANGE: 0
SORE THROAT: 0
DIAPHORESIS: 0
NECK STIFFNESS: 0
DIZZINESS: 0
BLOOD IN STOOL: 0
DYSPHORIC MOOD: 1
FREQUENCY: 0
ADENOPATHY: 0
POLYDIPSIA: 0
EYE ITCHING: 0
APPETITE CHANGE: 0
JOINT SWELLING: 0

## 2024-06-13 NOTE — PROGRESS NOTES
Subjective   Patient ID: Tammy Marks is a 69 y.o. female who presents for Follow-up (6 month;  review labs and medications).  Today she is accompanied by alone.     70 y/o female presents for follow up. Patient reports that she has been in relatively good health as of late. She does report some anxiety and depression due to her own health and her 's health. She will be starting chemotherapy for her colon cancer as well as taking oral medications starting next week. She reports some occasional abdominal pain and joint pain that is tolerable. She has no other complaints at this time.       Review of Systems   Constitutional:  Negative for activity change, appetite change, chills, diaphoresis, fatigue, fever and unexpected weight change.   HENT:  Negative for congestion, dental problem, drooling, ear discharge, ear pain, facial swelling, hearing loss, nosebleeds, postnasal drip, rhinorrhea, sinus pressure, sinus pain, sneezing, sore throat, tinnitus, trouble swallowing and voice change.    Eyes:  Negative for pain, discharge, redness, itching and visual disturbance.   Respiratory:  Negative for cough, chest tightness, shortness of breath and wheezing.    Cardiovascular:  Negative for chest pain, palpitations and leg swelling.   Gastrointestinal:  Positive for abdominal pain. Negative for blood in stool, constipation, diarrhea, nausea and vomiting.   Endocrine: Negative for cold intolerance, heat intolerance, polydipsia, polyphagia and polyuria.   Genitourinary:  Negative for decreased urine volume, dysuria, flank pain, frequency, hematuria, urgency and vaginal discharge.   Musculoskeletal:  Negative for arthralgias, back pain, gait problem, joint swelling, myalgias and neck stiffness.        Positive for joint pain   Skin:  Negative for color change, pallor, rash and wound.   Neurological:  Negative for dizziness.   Hematological:  Negative for adenopathy. Does not bruise/bleed easily.    Psychiatric/Behavioral:  Positive for dysphoric mood. Negative for agitation, behavioral problems and sleep disturbance. The patient is nervous/anxious.        Objective   /74 (BP Location: Left arm, Patient Position: Sitting, BP Cuff Size: Adult)   Pulse 97   Temp 36.6 °C (97.9 °F)   Resp 16   Wt 74.4 kg (164 lb)   LMP  (LMP Unknown)   SpO2 93%   BMI 25.69 kg/m²   BSA: 1.88 meters squared  Growth percentiles: Facility age limit for growth %angel is 20 years. Facility age limit for growth %angel is 20 years.     Physical Exam  Vitals and nursing note reviewed.   Constitutional:       General: She is not in acute distress.     Appearance: Normal appearance. She is normal weight. She is not ill-appearing or toxic-appearing.   HENT:      Head: Normocephalic.      Right Ear: Tympanic membrane, ear canal and external ear normal. There is no impacted cerumen.      Left Ear: Tympanic membrane, ear canal and external ear normal. There is no impacted cerumen.      Nose: Nose normal. No congestion or rhinorrhea.      Mouth/Throat:      Mouth: Mucous membranes are moist.      Pharynx: Oropharynx is clear. No oropharyngeal exudate or posterior oropharyngeal erythema.   Eyes:      General: No scleral icterus.        Right eye: No discharge.         Left eye: No discharge.      Extraocular Movements: Extraocular movements intact.      Conjunctiva/sclera: Conjunctivae normal.      Pupils: Pupils are equal, round, and reactive to light.   Neck:      Vascular: No carotid bruit.   Cardiovascular:      Rate and Rhythm: Normal rate and regular rhythm.      Pulses: Normal pulses.      Heart sounds: Normal heart sounds. No murmur heard.     No gallop.   Pulmonary:      Effort: No respiratory distress.      Breath sounds: No wheezing or rhonchi.   Chest:      Chest wall: No tenderness.   Abdominal:      General: Abdomen is flat. Bowel sounds are normal.      Palpations: Abdomen is soft. There is no mass.      Tenderness:  There is no abdominal tenderness. There is no guarding or rebound.      Hernia: No hernia is present.   Musculoskeletal:         General: No swelling or tenderness. Normal range of motion.      Cervical back: Normal range of motion. No rigidity or tenderness.      Right lower leg: No edema.      Left lower leg: No edema.   Lymphadenopathy:      Cervical: No cervical adenopathy.   Skin:     General: Skin is warm and dry.      Coloration: Skin is not pale.      Findings: No bruising, erythema or rash.   Neurological:      General: No focal deficit present.      Mental Status: She is alert and oriented to person, place, and time.      Sensory: No sensory deficit.      Coordination: Coordination normal.      Gait: Gait normal.      Deep Tendon Reflexes: Reflexes normal.   Psychiatric:         Mood and Affect: Mood normal.         Behavior: Behavior normal.         Thought Content: Thought content normal.         Judgment: Judgment normal.         Assessment/Plan   Problem List Items Addressed This Visit             ICD-10-CM    Chronic major depressive disorder, recurrent episode (CMS-HCC) F33.9     Stable on current medications  Continue meds and exercise.   Has increased stress with all the new health issues          Relevant Medications    citalopram (CeleXA) 20 mg tablet    Gastroesophageal reflux disease with esophagitis without hemorrhage K21.00     Stable. Continue current medications         Relevant Medications    pantoprazole (ProtoNix) 40 mg EC tablet    Mixed hyperlipidemia E78.2    Relevant Medications    atorvastatin (Lipitor) 40 mg tablet    Osteopenia M85.80    Relevant Medications    celecoxib (CeleBREX) 200 mg capsule    Malignant neoplasm of sigmoid colon (Multi) C18.7     Will be starting chemo next week per oncology         Acquired hypothyroidism E03.9     Stable   Will adjust based on labs         Relevant Medications    levothyroxine (Synthroid, Levoxyl) 25 mcg tablet    Chronic bronchitis with  wheezing (Multi) - Primary J42     Continue Symbicort.          Relevant Medications    budesonide-formoteroL (Symbicort) 160-4.5 mcg/actuation inhaler     I personally spent over 50% of a total 40 minutes in counseling and discussion with the patient and coordination of care as described above.     Current Outpatient Medications   Medication Sig Dispense Refill    cholecalciferol (Vitamin D-3) 25 MCG (1000 UT) capsule Take 1 capsule (25 mcg) by mouth once daily.      cyanocobalamin, vitamin B-12, 3,000 mcg capsule once every 24 hours.      albuterol (Ventolin HFA) 90 mcg/actuation inhaler Inhale 2 puffs every 4 hours if needed for wheezing or shortness of breath. 8 g 0    atorvastatin (Lipitor) 40 mg tablet Take 1 tablet (40 mg) by mouth once daily. 90 tablet 1    budesonide-formoteroL (Symbicort) 160-4.5 mcg/actuation inhaler Inhale 2 puffs 2 times a day. Rinse mouth with water after use to reduce aftertaste and incidence of candidiasis. Do not swallow. 10.2 g 3    celecoxib (CeleBREX) 200 mg capsule Take 1 capsule (200 mg) by mouth once daily. 90 capsule 1    citalopram (CeleXA) 20 mg tablet Take 1.5 tablets (30 mg) by mouth once daily. 135 tablet 1    levothyroxine (Synthroid, Levoxyl) 25 mcg tablet Take 1 tablet (25 mcg) by mouth once daily in the morning. Take before meals. on an empty stomach 90 tablet 1    pantoprazole (ProtoNix) 40 mg EC tablet Take 1 tablet (40 mg) by mouth 2 times a day before meals. take before breakfast and dinner 180 tablet 1     No current facility-administered medications for this visit.         F/U in 4 months for COPD add depression  Call if any new concerns  Continue current medications due to upcoming chemo treatments

## 2024-06-14 ENCOUNTER — APPOINTMENT (OUTPATIENT)
Dept: PRIMARY CARE | Facility: CLINIC | Age: 69
End: 2024-06-14
Payer: COMMERCIAL

## 2024-06-14 DIAGNOSIS — F33.9 CHRONIC MAJOR DEPRESSIVE DISORDER, RECURRENT EPISODE (CMS-HCC): Primary | ICD-10-CM

## 2024-06-14 NOTE — PROGRESS NOTES
Collaborative Care (CoCM)  Progress Note    Type of Interaction: Phone    Start Time: 11:30 am    End Time: 11:53 am        Appointment: Scheduled    Reason for Visit:   Chief Complaint   Patient presents with    Depression    Checking in with patient via phone.  Did not start chemotherapy, due to some issues with having medication she needed.  Patient reports feeling better and stronger emotionally to be able to deal with things.  Stressors at home with daughter in law, feels sorry for grand kids so they try to spend some more time with her grandson individually due to some reactions as of late.  Trying to distance self from behaviors and find ways to keep separate.  Does enjoy shopping as well/ retail therapy  Discussed  being able to take over the household if he has to.   Patient is feeling better at this point and is going to be starting chemotherapy soon, will reach back out in a month to touch base and check in. 23      Interval History / Patient Symptoms:     No data recorded      Interventions Provided: Treatment Planning and support      Progress Made: Moderate    Response to Intervention: open and engaging        Plan:   Follow up in 1 month      Follow Up / Next Appointment:

## 2024-06-28 ENCOUNTER — DOCUMENTATION (OUTPATIENT)
Dept: PRIMARY CARE | Facility: CLINIC | Age: 69
End: 2024-06-28
Payer: COMMERCIAL

## 2024-06-28 DIAGNOSIS — F33.9 CHRONIC MAJOR DEPRESSIVE DISORDER, RECURRENT EPISODE (CMS-HCC): Primary | ICD-10-CM

## 2024-07-12 ENCOUNTER — APPOINTMENT (OUTPATIENT)
Dept: RESPIRATORY THERAPY | Facility: HOSPITAL | Age: 69
End: 2024-07-12
Payer: COMMERCIAL

## 2024-08-01 ENCOUNTER — TELEPHONE (OUTPATIENT)
Dept: PRIMARY CARE | Facility: CLINIC | Age: 69
End: 2024-08-01
Payer: COMMERCIAL

## 2024-08-01 NOTE — TELEPHONE ENCOUNTER
Called and spoke to Ana from [facility] and confirmed that you will follow the orders.     I also called and spoke with the pt and she is still in the hospital and does not know when she will be out, but she will call the office to schedule a hospital follow up when she is out.

## 2024-08-01 NOTE — TELEPHONE ENCOUNTER
Ana from [facility] has called in wanting to know if you will follow through with home care orders? please advise    Ana number: [phone]

## 2024-08-07 ENCOUNTER — TELEPHONE (OUTPATIENT)
Dept: PRIMARY CARE | Facility: CLINIC | Age: 69
End: 2024-08-07
Payer: COMMERCIAL

## 2024-08-07 NOTE — TELEPHONE ENCOUNTER
Pt was discharged from the hospital on 8/5/24 pt would like to know if she could come in on Saturday for her hospital discharge.  Please advise    Pt number [phone]

## 2024-08-12 ENCOUNTER — APPOINTMENT (OUTPATIENT)
Dept: PRIMARY CARE | Facility: CLINIC | Age: 69
End: 2024-08-12
Payer: COMMERCIAL

## 2024-08-12 VITALS
OXYGEN SATURATION: 94 % | RESPIRATION RATE: 16 BRPM | BODY MASS INDEX: 25.06 KG/M2 | HEART RATE: 79 BPM | TEMPERATURE: 97.9 F | DIASTOLIC BLOOD PRESSURE: 76 MMHG | WEIGHT: 160 LBS | SYSTOLIC BLOOD PRESSURE: 110 MMHG

## 2024-08-12 DIAGNOSIS — J18.9 ACUTE PNEUMONIA: Primary | ICD-10-CM

## 2024-08-12 DIAGNOSIS — C18.7 MALIGNANT NEOPLASM OF SIGMOID COLON (MULTI): ICD-10-CM

## 2024-08-12 DIAGNOSIS — K44.9 HIATAL HERNIA: ICD-10-CM

## 2024-08-12 DIAGNOSIS — R06.02 SOB (SHORTNESS OF BREATH): ICD-10-CM

## 2024-08-12 PROBLEM — N28.89 NODULE OF KIDNEY: Status: ACTIVE | Noted: 2024-06-19

## 2024-08-12 PROBLEM — D69.6 THROMBOCYTOPENIA (CMS-HCC): Status: ACTIVE | Noted: 2024-07-27

## 2024-08-12 PROBLEM — R91.1 NODULE OF RIGHT LUNG: Status: ACTIVE | Noted: 2024-06-19

## 2024-08-12 PROBLEM — T45.1X5A CINV (CHEMOTHERAPY-INDUCED NAUSEA AND VOMITING): Status: ACTIVE | Noted: 2024-07-09

## 2024-08-12 PROBLEM — R79.89 ABNORMAL LFTS: Status: ACTIVE | Noted: 2024-07-18

## 2024-08-12 PROBLEM — R11.2 CINV (CHEMOTHERAPY-INDUCED NAUSEA AND VOMITING): Status: ACTIVE | Noted: 2024-07-09

## 2024-08-12 PROCEDURE — 1036F TOBACCO NON-USER: CPT | Performed by: FAMILY MEDICINE

## 2024-08-12 PROCEDURE — 1123F ACP DISCUSS/DSCN MKR DOCD: CPT | Performed by: FAMILY MEDICINE

## 2024-08-12 PROCEDURE — 1159F MED LIST DOCD IN RCRD: CPT | Performed by: FAMILY MEDICINE

## 2024-08-12 PROCEDURE — 99496 TRANSJ CARE MGMT HIGH F2F 7D: CPT | Performed by: FAMILY MEDICINE

## 2024-08-12 PROCEDURE — 1160F RVW MEDS BY RX/DR IN RCRD: CPT | Performed by: FAMILY MEDICINE

## 2024-08-12 RX ORDER — PREDNISONE 20 MG/1
TABLET ORAL
COMMUNITY
Start: 2024-08-06 | End: 2024-08-14

## 2024-08-12 ASSESSMENT — ENCOUNTER SYMPTOMS
JOINT SWELLING: 0
WOUND: 0
POLYPHAGIA: 0
FATIGUE: 0
AGITATION: 0
WHEEZING: 0
EYE ITCHING: 0
COLOR CHANGE: 0
DIAPHORESIS: 0
SINUS PRESSURE: 0
EYE PAIN: 0
SINUS PAIN: 0
FEVER: 0
COUGH: 0
HEMATURIA: 0
ARTHRALGIAS: 0
BACK PAIN: 0
SORE THROAT: 0
DYSURIA: 0
CHEST TIGHTNESS: 0
APPETITE CHANGE: 0
ABDOMINAL PAIN: 0
ACTIVITY CHANGE: 0
BLOOD IN STOOL: 0
VOMITING: 0
FREQUENCY: 0
VOICE CHANGE: 0
DIARRHEA: 0
CHILLS: 0
SLEEP DISTURBANCE: 0
MYALGIAS: 0
TROUBLE SWALLOWING: 0
UNEXPECTED WEIGHT CHANGE: 0
EYE REDNESS: 0
FLANK PAIN: 0
ADENOPATHY: 0
RHINORRHEA: 0
NAUSEA: 0
FACIAL SWELLING: 0
EYE DISCHARGE: 0
POLYDIPSIA: 0
DIZZINESS: 0
CONSTIPATION: 0
BRUISES/BLEEDS EASILY: 0
NERVOUS/ANXIOUS: 0
SHORTNESS OF BREATH: 0
PALPITATIONS: 0
NECK STIFFNESS: 0

## 2024-08-12 NOTE — PROGRESS NOTES
"Patient: Tammy Marks  : 1955  PCP: Sonia Wiley MD  MRN: [id number]  Program: No programs to display       Tammy Marks is a 69 y.o. female presenting today for follow-up after being discharged from the hospital 7 days ago. The main problem requiring admission was for Pneumonia and shortness of breath and hiatal hernia. The discharge summary and/or Transitional Care Management documentation was reviewed. Medication reconciliation was performed as indicated via the \"Jose R as Reviewed\" timestamp.     Tammy Marks was contacted by Transitional Care Management services two days after her discharge. This encounter and supporting documentation was reviewed.    Review of Systems   Constitutional:  Negative for activity change, appetite change, chills, diaphoresis, fatigue, fever and unexpected weight change.   HENT:  Negative for congestion, dental problem, drooling, ear discharge, ear pain, facial swelling, hearing loss, nosebleeds, postnasal drip, rhinorrhea, sinus pressure, sinus pain, sneezing, sore throat, tinnitus, trouble swallowing and voice change.    Eyes:  Negative for pain, discharge, redness, itching and visual disturbance.   Respiratory:  Negative for cough, chest tightness, shortness of breath and wheezing.    Cardiovascular:  Negative for chest pain, palpitations and leg swelling.   Gastrointestinal:  Negative for abdominal pain, blood in stool, constipation, diarrhea, nausea and vomiting.   Endocrine: Negative for cold intolerance, heat intolerance, polydipsia, polyphagia and polyuria.   Genitourinary:  Negative for decreased urine volume, dysuria, flank pain, frequency, hematuria and urgency.   Musculoskeletal:  Negative for arthralgias, back pain, gait problem, joint swelling, myalgias and neck stiffness.   Skin:  Negative for color change, pallor, rash and wound.   Neurological:  Negative for dizziness.   Hematological:  Negative for adenopathy. Does not bruise/bleed easily. "   Psychiatric/Behavioral:  Negative for agitation, behavioral problems and sleep disturbance. The patient is not nervous/anxious.        /76 (BP Location: Right arm, Patient Position: Sitting, BP Cuff Size: Adult)   Pulse 79   Temp 36.6 °C (97.9 °F)   Resp 16   Wt 72.6 kg (160 lb)   LMP  (LMP Unknown)   SpO2 94%   BMI 25.06 kg/m²     Physical Exam  Vitals and nursing note reviewed.   Constitutional:       General: She is not in acute distress.     Appearance: Normal appearance. She is normal weight. She is not ill-appearing or toxic-appearing.   HENT:      Head: Normocephalic.      Right Ear: Tympanic membrane, ear canal and external ear normal. There is no impacted cerumen.      Left Ear: Tympanic membrane, ear canal and external ear normal. There is no impacted cerumen.      Nose: Nose normal. No congestion or rhinorrhea.      Mouth/Throat:      Mouth: Mucous membranes are moist.      Pharynx: Oropharynx is clear. No oropharyngeal exudate or posterior oropharyngeal erythema.   Eyes:      General: No scleral icterus.        Right eye: No discharge.         Left eye: No discharge.      Extraocular Movements: Extraocular movements intact.      Conjunctiva/sclera: Conjunctivae normal.      Pupils: Pupils are equal, round, and reactive to light.   Neck:      Vascular: No carotid bruit.   Cardiovascular:      Rate and Rhythm: Normal rate and regular rhythm.      Pulses: Normal pulses.      Heart sounds: Normal heart sounds. No murmur heard.     No gallop.   Pulmonary:      Effort: Pulmonary effort is normal. No respiratory distress.      Breath sounds: No stridor. Examination of the left-lower field reveals wheezing and rhonchi. Wheezing and rhonchi present. No rales.   Chest:      Chest wall: No tenderness.   Abdominal:      General: Abdomen is flat. Bowel sounds are normal.      Palpations: Abdomen is soft. There is no mass.      Tenderness: There is no abdominal tenderness. There is no guarding or  rebound.      Hernia: No hernia is present.   Musculoskeletal:         General: No swelling or tenderness. Normal range of motion.      Cervical back: Normal range of motion. No rigidity or tenderness.      Right lower leg: No edema.      Left lower leg: No edema.   Lymphadenopathy:      Cervical: No cervical adenopathy.      Upper Body:      Right upper body: No supraclavicular or axillary adenopathy.      Left upper body: No supraclavicular or axillary adenopathy.   Skin:     General: Skin is warm and dry.      Coloration: Skin is not pale.      Findings: No bruising, erythema or rash.   Neurological:      General: No focal deficit present.      Mental Status: She is alert and oriented to person, place, and time.      Sensory: No sensory deficit.      Coordination: Coordination normal.      Gait: Gait normal.      Deep Tendon Reflexes: Reflexes normal.   Psychiatric:         Mood and Affect: Mood normal.         Behavior: Behavior normal.         Thought Content: Thought content normal.         Judgment: Judgment normal.         The complexity of medical decision making for this patient's transitional care is high.    Assessment/Plan   Problem List Items Addressed This Visit             ICD-10-CM    Malignant neoplasm of sigmoid colon (Multi) C18.7    Hiatal hernia K44.9    Relevant Orders    XR chest 2 views     Other Visit Diagnoses         Codes    Acute pneumonia    -  Primary J18.9    Relevant Orders    XR chest 2 views    SOB (shortness of breath)     R06.02    Relevant Orders    XR chest 2 views          Current Outpatient Medications   Medication Sig Dispense Refill    predniSONE (Deltasone) 20 mg tablet Take by mouth once daily.      albuterol (Ventolin HFA) 90 mcg/actuation inhaler Inhale 2 puffs every 4 hours if needed for wheezing or shortness of breath. 8 g 0    atorvastatin (Lipitor) 40 mg tablet Take 1 tablet (40 mg) by mouth once daily. 90 tablet 1    budesonide-formoteroL (Symbicort) 160-4.5  mcg/actuation inhaler Inhale 2 puffs 2 times a day. Rinse mouth with water after use to reduce aftertaste and incidence of candidiasis. Do not swallow. 10.2 g 3    celecoxib (CeleBREX) 200 mg capsule Take 1 capsule (200 mg) by mouth once daily. 90 capsule 1    cholecalciferol (Vitamin D-3) 25 MCG (1000 UT) capsule Take 1 capsule (25 mcg) by mouth once daily.      citalopram (CeleXA) 20 mg tablet Take 1.5 tablets (30 mg) by mouth once daily. 135 tablet 1    cyanocobalamin, vitamin B-12, 3,000 mcg capsule once every 24 hours.      levothyroxine (Synthroid, Levoxyl) 25 mcg tablet Take 1 tablet (25 mcg) by mouth once daily in the morning. Take before meals. on an empty stomach 90 tablet 1    pantoprazole (ProtoNix) 40 mg EC tablet Take 1 tablet (40 mg) by mouth 2 times a day before meals. take before breakfast and dinner 180 tablet 1     No current facility-administered medications for this visit.

## 2024-08-12 NOTE — PATIENT INSTRUCTIONS
Follow up as scheduled  Call if worse or if no improvement   Continue incentive spirometry    Current Outpatient Medications   Medication Sig Dispense Refill    predniSONE (Deltasone) 20 mg tablet Take by mouth once daily.      albuterol (Ventolin HFA) 90 mcg/actuation inhaler Inhale 2 puffs every 4 hours if needed for wheezing or shortness of breath. 8 g 0    atorvastatin (Lipitor) 40 mg tablet Take 1 tablet (40 mg) by mouth once daily. 90 tablet 1    budesonide-formoteroL (Symbicort) 160-4.5 mcg/actuation inhaler Inhale 2 puffs 2 times a day. Rinse mouth with water after use to reduce aftertaste and incidence of candidiasis. Do not swallow. 10.2 g 3    celecoxib (CeleBREX) 200 mg capsule Take 1 capsule (200 mg) by mouth once daily. 90 capsule 1    cholecalciferol (Vitamin D-3) 25 MCG (1000 UT) capsule Take 1 capsule (25 mcg) by mouth once daily.      citalopram (CeleXA) 20 mg tablet Take 1.5 tablets (30 mg) by mouth once daily. 135 tablet 1    cyanocobalamin, vitamin B-12, 3,000 mcg capsule once every 24 hours.      levothyroxine (Synthroid, Levoxyl) 25 mcg tablet Take 1 tablet (25 mcg) by mouth once daily in the morning. Take before meals. on an empty stomach 90 tablet 1    pantoprazole (ProtoNix) 40 mg EC tablet Take 1 tablet (40 mg) by mouth 2 times a day before meals. take before breakfast and dinner 180 tablet 1     No current facility-administered medications for this visit.

## 2024-08-16 ENCOUNTER — TELEPHONE (OUTPATIENT)
Dept: PRIMARY CARE | Facility: CLINIC | Age: 69
End: 2024-08-16
Payer: COMMERCIAL

## 2024-08-16 NOTE — TELEPHONE ENCOUNTER
Brenda from [facility] called in stating that pt has had 5 bowel movement in the last 24hrs. Its diarrhea. They are just concerned because she was on an antibiotic and just wanted to let you know what was going on.    Brenda: [phone]

## 2024-08-19 NOTE — TELEPHONE ENCOUNTER
Patient states that she is feeling better,  but does not think she needs stool studies at this time

## 2024-08-21 ENCOUNTER — HOSPITAL ENCOUNTER (OUTPATIENT)
Dept: RADIOLOGY | Facility: CLINIC | Age: 69
Discharge: HOME | End: 2024-08-21
Payer: COMMERCIAL

## 2024-08-21 DIAGNOSIS — J18.9 ACUTE PNEUMONIA: ICD-10-CM

## 2024-08-21 DIAGNOSIS — R06.02 SOB (SHORTNESS OF BREATH): ICD-10-CM

## 2024-08-21 DIAGNOSIS — K44.9 HIATAL HERNIA: ICD-10-CM

## 2024-08-21 PROCEDURE — 71046 X-RAY EXAM CHEST 2 VIEWS: CPT | Performed by: RADIOLOGY

## 2024-08-21 PROCEDURE — 71046 X-RAY EXAM CHEST 2 VIEWS: CPT

## 2024-08-27 ENCOUNTER — TELEPHONE (OUTPATIENT)
Dept: PRIMARY CARE | Facility: CLINIC | Age: 69
End: 2024-08-27
Payer: COMMERCIAL

## 2024-10-09 NOTE — PROGRESS NOTES
Subjective   Patient ID: Tammy Marks is a 68 y.o. female who presents for Depression (3 month follow up ).  Today she is accompanied by alone.     Depression  Visit Type: follow-up  Patient is not experiencing: anhedonia, excessive worry, fatigue, feelings of hopelessness, feelings of worthlessness, insomnia, nervousness/anxiety, palpitations, panic and shortness of breath.    Sleep quality: good  Compliance with medications:  %    Patient has weaned down to 30 mg and this change was well tolerated. She went down to 20 mg and experienced 6-8 hot flashes per day 2 weeks after this dose change. Last week, she has gone back up to 30 mg and doing well. Husbands health has been a stressor as of late.      Review of Systems   Constitutional:  Negative for activity change, appetite change, chills, diaphoresis, fatigue, fever and unexpected weight change.   HENT:  Positive for hearing loss. Negative for congestion, dental problem, drooling, ear discharge, ear pain, facial swelling, nosebleeds, postnasal drip, rhinorrhea, sinus pressure, sinus pain, sneezing, sore throat, tinnitus, trouble swallowing and voice change.    Eyes:  Negative for pain, discharge, redness, itching and visual disturbance.   Respiratory:  Negative for cough, chest tightness, shortness of breath and wheezing.    Cardiovascular:  Negative for chest pain, palpitations and leg swelling.   Gastrointestinal:  Negative for abdominal pain, blood in stool, constipation, diarrhea, nausea and vomiting.   Endocrine: Positive for heat intolerance. Negative for cold intolerance, polydipsia, polyphagia and polyuria.   Genitourinary:  Negative for decreased urine volume, dysuria, flank pain, frequency, hematuria and urgency.   Musculoskeletal:  Negative for arthralgias, back pain, gait problem, joint swelling, myalgias and neck stiffness.   Skin:  Negative for color change, pallor, rash and wound.   Neurological:  Negative for dizziness.   Hematological:   Negative for adenopathy. Does not bruise/bleed easily.   Psychiatric/Behavioral:  Positive for depression. Negative for agitation, behavioral problems and sleep disturbance. The patient is not nervous/anxious and does not have insomnia.        Objective   Blood Pressure 118/76 (BP Location: Left arm, Patient Position: Sitting, BP Cuff Size: Adult)   Pulse 69   Temperature 36.5 °C (97.7 °F)   Respiration 16   Weight 78 kg (172 lb)   Last Menstrual Period  (LMP Unknown)   Body Mass Index 26.74 kg/m²   BSA: 1.92 meters squared  Growth percentiles: Facility age limit for growth %angel is 20 years. Facility age limit for growth %angel is 20 years.     Physical Exam  Vitals and nursing note reviewed.   Constitutional:       Appearance: Normal appearance.   HENT:      Head: Normocephalic.      Right Ear: There is impacted cerumen.      Left Ear: There is impacted cerumen.      Mouth/Throat:      Mouth: Mucous membranes are moist.      Pharynx: Oropharynx is clear.   Cardiovascular:      Rate and Rhythm: Normal rate and regular rhythm.      Pulses: Normal pulses.      Heart sounds: Normal heart sounds.   Musculoskeletal:         General: Normal range of motion.      Cervical back: Normal range of motion and neck supple.   Lymphadenopathy:      Cervical: No cervical adenopathy.   Neurological:      Mental Status: She is alert.   Psychiatric:         Mood and Affect: Mood normal.         Behavior: Behavior normal.         Assessment/Plan   Problem List Items Addressed This Visit       Chronic major depressive disorder, recurrent episode (CMS/HCC) - Primary    Relevant Medications    citalopram (CeleXA) 20 mg tablet     Other Visit Diagnoses       Bilateral impacted cerumen        Relevant Orders    Ear Cerumen Removal    Need for immunization against influenza        Relevant Orders    Flu vaccine, quadrivalent, high-dose, preservative free, age 65y+ (FLUZONE)          Patient ID: Tammy Marks is a 68 y.o.  female.    Ear Cerumen Removal    Date/Time: 9/20/2023 10:54 AM    Performed by: Sonia Wiley MD  Authorized by: Sonia Wiley MD    Consent:     Consent obtained:  Verbal    Consent given by:  Patient    Risks, benefits, and alternatives were discussed: yes      Risks discussed:  Bleeding, dizziness, incomplete removal, infection, pain and TM perforation    Alternatives discussed:  No treatment, delayed treatment, alternative treatment and observation  Universal protocol:     Patient identity confirmed:  Verbally with patient  Procedure details:     Location:  L ear and R ear    Procedure type: irrigation      Procedure outcomes: cerumen removed    Post-procedure details:     Inspection:  Ear canal clear    Procedure completion:  Tolerated well, no immediate complications    F/U in 4 months for MW visit  Ears cleaned today    Depression      What is depression?;  When doctors talk about depression, they mean the medical illness called major depression. Someone who has major depression has symptoms nearly every day, all day, for 2 weeks or longer. There is also a minor form of depression that causes less severe symptoms. Both kinds of depression have the same causes and treatment.;  Depression can affect people of all ages and is different for every person. A person who has depression can't control his or her feelings. If you or your child, teen, or older relative is depressed, it's not his or her fault.     Women are twice as likely as men to experience depression. The reason for this is unknown, but changes in a woman's hormone levels may be related to depression.     What are the symptoms of depression?;  The symptoms of depression are different for every person. You may have one or many of the symptoms listed below. Your symptoms may include only emotional or only physical symptoms, or both.;     Emotional symptoms  Crying easily or for no reason ;  Feeling guilty or worthless ;  Feeling restless, irritated,  "and easily annoyed ;  Feeling sad, numb, or hopeless ;  Losing interest or pleasure in things you used to enjoy (including sex) ;  Thinking about death or suicide;     Physical symptoms  Changes in appetite (eating more than usual, or eating less than usual) ;  Feeling very tired all the time ;  Having other aches and pains that don't get better with treatment ;  Having trouble paying attention, recalling things, concentrating, and making decisions ;  Headaches, backaches, or digestive problems ;  Sleeping too much, or having problems sleeping ;  Unintended weight loss or gain;  The symptoms of depression may be different for children, teens, and seniors.;     What causes depression?  Depression may be caused by an imbalance of chemicals in the brain. Sometimes there aren't enough chemical messengers (called neurotransmitters) in the brain. Examples of neurotransmitters that affect your mood are serotonin (say: \"sair-a-tone-in\"), norepinephrine (say: \"nor-ep-in-ef-rin\"), and dopamine (say: \"dope-a-mean\"). A chemical imbalance in the brain may be caused by one or more of the following:;  Your genes. Sometimes depression is hereditary, meaning it runs in your family. If you have a parent or sibling who has depression, you may be more at risk for having depression yourself. ;  A medical condition. Problems with your thyroid, nutrient deficiencies, or chronic diseases such as heart disease, diabetes, or cancer may cause depression. ;  Events in your life. Depression can be triggered by stressful events in your life, such as the death of someone you love, a divorce, chronic illness, or loss of a job.   Medicines, drugs, or alcohol. Taking certain medicines, abusing drugs or alcohol, or having other illnesses can also lead to depression.;     Depression is not caused by personal weakness, laziness, or lack of willpower.;     Can giving birth cause depression?;  In the days following the birth of a baby, it is common for " some mothers to have mood swings. They may feel a little depressed, have a hard time concentrating, lose their appetite, or find that they can't sleep well even when the baby is asleep. This is called the baby blues and goes away within 10 days after delivery. However, some women have worse symptoms or symptoms that last longer. This is called postpartum depression.     How is depression treated?  Depression can be treated with medicines, with counseling <http://[email or web address]/familydoctor/en/prevention-wellness/emotional-wellbeing/[email or web address]-health/therapy-and-counseling.html>, or with both. A nutritious diet, exercising on a regular basis, and avoiding alcohol, drugs, and too much caffeine can also help.;     How long will the depression last?  This depends on how soon you get help. Left untreated, depression can last for weeks, months, or even years. The main risk in not getting treatment is suicide. Treatment can help depression lift in 8 to 12 weeks or less.;     What medicines are used to treat depression?;  Medicines that treat depression are called antidepressants. They help increase the number of chemical messengers (serotonin, norepinephrine, dopamine) in your brain.  Antidepressants work differently for different people. They also have different side effects. So, even if one medicine bothers you or doesn't work for you, another may help. You may notice improvement as soon as 1 week after you start taking the medicine. But you probably won't see the full effects for about 8 to 12 weeks. You may have side effects at first, but they tend to decrease after a couple of weeks. Don't stop taking the medicine without checking with your doctor first.;     How does counseling help?;   A combination of medicine and talk therapy is usually the most effective way of treating more severe depression. If you continue the combination treatment for at least a year, you are less likely to have depression come back.;you talk  with a trained therapist or counselor about things that are going on in your life. The focus may be on your thoughts and beliefs, on things that happened in your past, or on your relationships. Or the focus may be on your behavior, how it's affecting you, and what you can do differently.      Tips on getting through depression  DO:  Pace yourself.   Get involved in activities that make you feel good or feel like you've achieved something.   Avoid drugs and alcohol. Both make depression worse. Both can cause dangerous side effects with antidepressant medicines.   Exercise often to make yourself feel better. Physical activity seems to cause a chemical reaction in the body that can improve your mood. Exercising 4 to 6 times a week for at least 30 minutes each time is a good goal. But even less activity can be helpful.   Eat balanced meals and healthful foods.   Get enough sleep.   Take your medicine and/or go to counseling as often as your doctor recommends. Your medicine won't work if you only take it once in a while.   Set small goals for yourself, because you may have less energy.   Encourage yourself.   Get as much information as you can about depression and how to treat it.   Call your doctor or the local suicide crisis center right away if you start thinking about suicide.  DON'T:  Don't isolate yourself. Stay in touch with your loved ones and friends, your Sikh advisor, and your family doctor.   Don't believe negative thoughts you may have, such as blaming yourself or expecting to fail. This thinking is part of depression. These thoughts will go away as your depression lifts.   Don't blame yourself for your depression. You didn't cause it.   Don't make major life decisions (for example, about separation or divorce). You may not be thinking clearly while you are depressed, so the decisions you make at this time may not be the best ones for you. If you must make a big decision, ask someone you trust to help  "you.   Don't expect to do everything you normally can. Set a realistic schedule.   Don't get discouraged. It will take time for your depression to lift fully. Be patient with yourself.   Don't give up.     READ: \"The Mindfulness and Acceptance Workbook for Depression\" by Janis  \"The Mindful Way Through Depression\" by Alberto Jerez, Hung, and Cabot-Zinn        Suicide;  People who have depression sometimes think about suicide. This thinking is a common part of the depression. If you have thoughts about hurting yourself, tell someone. You could tell your doctor, your friends, your family, or call your local suicide hotline, such as the National Suicide Prevention Lifeline at 1-547.280.9537.;    " Assessment and plan of treatment:	fall down stairs  ann obs protocol   1 Principal Discharge DX:	[facility] (closed head injury)  Assessment and plan of treatment:	fall down stairs  ann obs protocol

## 2024-10-17 ENCOUNTER — TELEPHONE (OUTPATIENT)
Dept: PRIMARY CARE | Facility: CLINIC | Age: 69
End: 2024-10-17

## 2024-10-17 NOTE — TELEPHONE ENCOUNTER
Dr. Chuck Yip office called requesting a referral for vascular. NPI number [id number] Dx [id number] for varicose veins. Please fax to [phone]

## 2024-10-18 DIAGNOSIS — I83.93 VARICOSE VEINS OF BOTH LOWER EXTREMITIES, UNSPECIFIED WHETHER COMPLICATED: ICD-10-CM

## 2024-10-22 ENCOUNTER — TELEPHONE (OUTPATIENT)
Dept: PRIMARY CARE | Facility: CLINIC | Age: 69
End: 2024-10-22
Payer: COMMERCIAL

## 2024-10-22 NOTE — TELEPHONE ENCOUNTER
Dr. Chuck Spencer needs a referral for pt through [facility] it requires a authorization please send to fax number [phone]

## 2024-11-04 ENCOUNTER — APPOINTMENT (OUTPATIENT)
Dept: PRIMARY CARE | Facility: CLINIC | Age: 69
End: 2024-11-04
Payer: COMMERCIAL

## 2024-11-20 ENCOUNTER — APPOINTMENT (OUTPATIENT)
Dept: PRIMARY CARE | Facility: CLINIC | Age: 69
End: 2024-11-20
Payer: COMMERCIAL

## 2024-11-20 VITALS
DIASTOLIC BLOOD PRESSURE: 74 MMHG | TEMPERATURE: 98.4 F | OXYGEN SATURATION: 95 % | WEIGHT: 166.2 LBS | BODY MASS INDEX: 26.09 KG/M2 | HEIGHT: 67 IN | HEART RATE: 78 BPM | SYSTOLIC BLOOD PRESSURE: 114 MMHG

## 2024-11-20 DIAGNOSIS — E55.9 VITAMIN D DEFICIENCY: ICD-10-CM

## 2024-11-20 DIAGNOSIS — F33.9 CHRONIC MAJOR DEPRESSIVE DISORDER, RECURRENT EPISODE (CMS-HCC): ICD-10-CM

## 2024-11-20 DIAGNOSIS — M85.80 OSTEOPENIA, UNSPECIFIED LOCATION: ICD-10-CM

## 2024-11-20 DIAGNOSIS — E03.9 ACQUIRED HYPOTHYROIDISM: ICD-10-CM

## 2024-11-20 DIAGNOSIS — E78.2 MIXED HYPERLIPIDEMIA: ICD-10-CM

## 2024-11-20 DIAGNOSIS — H61.23 BILATERAL IMPACTED CERUMEN: ICD-10-CM

## 2024-11-20 DIAGNOSIS — K21.00 GASTROESOPHAGEAL REFLUX DISEASE WITH ESOPHAGITIS WITHOUT HEMORRHAGE: Primary | ICD-10-CM

## 2024-11-20 DIAGNOSIS — E53.8 VITAMIN B 12 DEFICIENCY: ICD-10-CM

## 2024-11-20 DIAGNOSIS — R73.09 ABNORMAL GLUCOSE: ICD-10-CM

## 2024-11-20 PROBLEM — R79.89 ABNORMAL LFTS: Status: RESOLVED | Noted: 2024-07-18 | Resolved: 2024-11-20

## 2024-11-20 PROBLEM — J98.4 DRUG-INDUCED PNEUMONITIS: Status: RESOLVED | Noted: 2024-09-11 | Resolved: 2024-11-20

## 2024-11-20 PROBLEM — T50.905A DRUG-INDUCED PNEUMONITIS: Status: RESOLVED | Noted: 2024-09-11 | Resolved: 2024-11-20

## 2024-11-20 PROBLEM — M77.8 RIGHT SHOULDER TENDINITIS: Status: ACTIVE | Noted: 2024-09-11

## 2024-11-20 PROCEDURE — 1123F ACP DISCUSS/DSCN MKR DOCD: CPT | Performed by: FAMILY MEDICINE

## 2024-11-20 PROCEDURE — 69209 REMOVE IMPACTED EAR WAX UNI: CPT | Performed by: FAMILY MEDICINE

## 2024-11-20 PROCEDURE — 1159F MED LIST DOCD IN RCRD: CPT | Performed by: FAMILY MEDICINE

## 2024-11-20 PROCEDURE — 1036F TOBACCO NON-USER: CPT | Performed by: FAMILY MEDICINE

## 2024-11-20 PROCEDURE — 1160F RVW MEDS BY RX/DR IN RCRD: CPT | Performed by: FAMILY MEDICINE

## 2024-11-20 PROCEDURE — 99214 OFFICE O/P EST MOD 30 MIN: CPT | Performed by: FAMILY MEDICINE

## 2024-11-20 PROCEDURE — 3008F BODY MASS INDEX DOCD: CPT | Performed by: FAMILY MEDICINE

## 2024-11-20 RX ORDER — DULOXETIN HYDROCHLORIDE 30 MG/1
30 CAPSULE, DELAYED RELEASE ORAL DAILY
Qty: 90 CAPSULE | Refills: 0 | Status: SHIPPED | OUTPATIENT
Start: 2024-11-20

## 2024-11-20 RX ORDER — CITALOPRAM 20 MG/1
10 TABLET, FILM COATED ORAL DAILY
COMMUNITY
Start: 2024-11-20

## 2024-11-20 RX ORDER — CELECOXIB 200 MG/1
200 CAPSULE ORAL DAILY
Qty: 90 CAPSULE | Refills: 1 | Status: SHIPPED | OUTPATIENT
Start: 2024-11-20

## 2024-11-20 RX ORDER — PANTOPRAZOLE SODIUM 40 MG/1
40 TABLET, DELAYED RELEASE ORAL
Qty: 180 TABLET | Refills: 1 | Status: SHIPPED | OUTPATIENT
Start: 2024-11-20

## 2024-11-20 RX ORDER — LEVOTHYROXINE SODIUM 25 UG/1
25 TABLET ORAL
Qty: 90 TABLET | Refills: 1 | Status: SHIPPED | OUTPATIENT
Start: 2024-11-20

## 2024-11-20 RX ORDER — ATORVASTATIN CALCIUM 40 MG/1
40 TABLET, FILM COATED ORAL DAILY
Qty: 90 TABLET | Refills: 1 | Status: SHIPPED | OUTPATIENT
Start: 2024-11-20

## 2024-11-20 ASSESSMENT — ENCOUNTER SYMPTOMS
COUGH: 0
RHINORRHEA: 0
IRRITABILITY: 1
POLYDIPSIA: 0
WOUND: 0
EYE PAIN: 0
SHORTNESS OF BREATH: 0
ABDOMINAL PAIN: 0
CHEST TIGHTNESS: 0
FEELINGS OF WORTHLESSNESS: 1
NAUSEA: 0
ANHEDONIA: 0
UNEXPECTED WEIGHT CHANGE: 0
CONSTIPATION: 0
VOICE CHANGE: 0
EYE ITCHING: 0
MUSCLE TENSION: 0
RESTLESSNESS: 1
INSOMNIA: 1
EYE REDNESS: 0
HYPERSOMNIA: 0
PSYCHOMOTOR AGITATION: 0
MALAISE: 0
NERVOUS/ANXIOUS: 1
BRUISES/BLEEDS EASILY: 0
ARTHRALGIAS: 0
SORE THROAT: 0
WEIGHT LOSS: 0
FREQUENCY: 0
WEIGHT GAIN: 0
VOMITING: 0
DECREASED CONCENTRATION: 0
NECK STIFFNESS: 0
HOPELESSNESS: 1
AGITATION: 0
SINUS PAIN: 0
APPETITE CHANGE: 0
HYPERVENTILATION: 0
JOINT SWELLING: 0
THOUGHT CONTENT - OBSESSIONS: 0
SLEEP QUALITY: FAIR
COLOR CHANGE: 0
DYSURIA: 0
LOSS OF SENSATION IN FEET: 0
TROUBLE SWALLOWING: 0
MEMORY IMPAIRMENT: 0
WHEEZING: 0
COMPULSIONS: 0
PALPITATIONS: 0
CHILLS: 0
POLYPHAGIA: 0
DIZZINESS: 0
EYE DISCHARGE: 0
PANIC: 1
CHOKING: 0
DEPRESSION: 1
FLANK PAIN: 0
HEMATURIA: 0
NIGHTTIME AWAKENINGS: OCCASIONAL
OCCASIONAL FEELINGS OF UNSTEADINESS: 0
SINUS PRESSURE: 0
CONFUSION: 0
DIAPHORESIS: 0
DEPRESSED MOOD: 1
SLEEP DISTURBANCE: 1
THOUGHTS THAT DEATH WOULD BE EASIER: 0
FEVER: 0
BACK PAIN: 0
DIARRHEA: 0
PSYCHOMOTOR RETARDATION: 0
ADENOPATHY: 0
FATIGUE: 0
BLOOD IN STOOL: 0
MYALGIAS: 0
ACTIVITY CHANGE: 0
FACIAL SWELLING: 0
DEPRESSION: 0

## 2024-11-20 NOTE — PROGRESS NOTES
Subjective   Patient ID: Tammy Marks is a 69 y.o. female who presents for Med Refill (F/U).  Today she is accompanied by alone.     Depression  Visit Type: follow-up  Patient presents with the following symptoms: depressed mood, excessive worry, feelings of hopelessness, feelings of worthlessness, insomnia, irritability, nervousness/anxiety, panic and restlessness.  Patient is not experiencing: anhedonia, chest pain, choking sensation, compulsions, confusion, decreased concentration, dizziness, dry mouth, fatigue, hypersomnia, hyperventilation, impotence, malaise, memory impairment, muscle tension, nausea, obsessions, palpitations, psychomotor agitation, psychomotor retardation, shortness of breath, suicidal ideas, suicidal planning, thoughts of death, weight gain and weight loss.  Frequency of symptoms: constantly   Severity: causing significant distress   Sleep quality: fair  Nighttime awakenings: occasional  Compliance with medications:  % (lowered her dose of Celexa to 20 mg as recommended and since then has been worse)        Review of Systems   Constitutional:  Positive for irritability. Negative for activity change, appetite change, chills, diaphoresis, fatigue, fever, unexpected weight change, weight gain and weight loss.   HENT:  Negative for congestion, dental problem, drooling, ear discharge, ear pain, facial swelling, hearing loss, nosebleeds, postnasal drip, rhinorrhea, sinus pressure, sinus pain, sneezing, sore throat, tinnitus, trouble swallowing and voice change.    Eyes:  Negative for pain, discharge, redness, itching and visual disturbance.   Respiratory:  Negative for cough, choking, chest tightness, shortness of breath and wheezing.    Cardiovascular:  Negative for chest pain, palpitations and leg swelling.   Gastrointestinal:  Negative for abdominal pain, blood in stool, constipation, diarrhea, nausea and vomiting.   Endocrine: Negative for cold intolerance, heat intolerance,  "polydipsia, polyphagia and polyuria.   Genitourinary:  Negative for decreased urine volume, dysuria, flank pain, frequency, hematuria, impotence and urgency.   Musculoskeletal:  Negative for arthralgias, back pain, gait problem, joint swelling, myalgias and neck stiffness.   Skin:  Negative for color change, pallor, rash and wound.   Neurological:  Negative for dizziness.   Hematological:  Negative for adenopathy. Does not bruise/bleed easily.   Psychiatric/Behavioral:  Positive for depression and sleep disturbance. Negative for agitation, behavioral problems, confusion, decreased concentration and suicidal ideas. The patient is nervous/anxious and has insomnia.        Objective   /74   Pulse 78   Temp 36.9 °C (98.4 °F) (Oral)   Ht 1.702 m (5' 7\")   Wt 75.4 kg (166 lb 3.2 oz)   LMP  (LMP Unknown)   SpO2 95%   BMI 26.03 kg/m²   BSA: 1.89 meters squared  Growth percentiles: Facility age limit for growth %angel is 20 years. Facility age limit for growth %angel is 20 years.     Physical Exam  Vitals and nursing note reviewed.   Constitutional:       General: She is not in acute distress.     Appearance: Normal appearance. She is not ill-appearing, toxic-appearing or diaphoretic.   HENT:      Head: Normocephalic.      Right Ear: Tympanic membrane, ear canal and external ear normal. There is impacted cerumen.      Left Ear: Tympanic membrane, ear canal and external ear normal. There is impacted cerumen.      Nose: Nose normal. No congestion or rhinorrhea.      Mouth/Throat:      Mouth: Mucous membranes are moist.      Pharynx: Oropharynx is clear. No oropharyngeal exudate or posterior oropharyngeal erythema.   Eyes:      General: No scleral icterus.        Right eye: No discharge.         Left eye: No discharge.      Extraocular Movements: Extraocular movements intact.      Conjunctiva/sclera: Conjunctivae normal.      Pupils: Pupils are equal, round, and reactive to light.   Cardiovascular:      Rate and " Rhythm: Normal rate and regular rhythm.      Pulses: Normal pulses.      Heart sounds: Normal heart sounds. No murmur heard.     No friction rub. No gallop.   Pulmonary:      Effort: Pulmonary effort is normal. No respiratory distress.      Breath sounds: Normal breath sounds. No stridor. No wheezing, rhonchi or rales.   Chest:      Chest wall: No tenderness.   Musculoskeletal:         General: Normal range of motion.      Cervical back: Normal range of motion and neck supple. No rigidity or tenderness.      Right lower leg: No edema.      Left lower leg: No edema.   Lymphadenopathy:      Cervical: No cervical adenopathy.   Skin:     General: Skin is warm and dry.   Neurological:      General: No focal deficit present.      Mental Status: She is alert and oriented to person, place, and time.      Sensory: No sensory deficit.      Motor: No weakness.      Coordination: Coordination normal.   Psychiatric:         Mood and Affect: Mood normal.         Behavior: Behavior normal.         Thought Content: Thought content normal.         Judgment: Judgment normal.       Patient ID: Tammy Marks is a 69 y.o. female.    Ear Cerumen Removal    Date/Time: 11/20/2024 1:04 PM    Performed by: Sonia Wiley MD  Authorized by: Sonia Wiley MD    Consent:     Consent obtained:  Verbal    Consent given by:  Patient    Risks, benefits, and alternatives were discussed: yes      Risks discussed:  Bleeding, infection, pain, TM perforation, incomplete removal and dizziness    Alternatives discussed:  No treatment, delayed treatment, alternative treatment, observation and referral  Universal protocol:     Procedure explained and questions answered to patient or proxy's satisfaction: yes      Patient identity confirmed:  Verbally with patient  Procedure details:     Location:  R ear and L ear    Procedure type: irrigation      Procedure outcomes: cerumen removed    Post-procedure details:     Inspection:  TM intact and ear canal  clear    Hearing quality:  Normal    Procedure completion:  Tolerated well, no immediate complications    Assessment/Plan   Problem List Items Addressed This Visit             ICD-10-CM    Chronic major depressive disorder, recurrent episode (CMS-HCC) F33.9    Relevant Medications    citalopram (CeleXA) 20 mg tablet    DULoxetine (Cymbalta) 30 mg DR capsule    Gastroesophageal reflux disease with esophagitis without hemorrhage - Primary K21.00    Relevant Medications    pantoprazole (ProtoNix) 40 mg EC tablet    Mixed hyperlipidemia E78.2    Relevant Medications    atorvastatin (Lipitor) 40 mg tablet    Other Relevant Orders    Comprehensive Metabolic Panel    Vitamin B12    Lipid Panel    Magnesium    Vitamin D 25-Hydroxy,Total (for eval of Vitamin D levels)    CBC and Auto Differential    Osteopenia M85.80    Relevant Medications    celecoxib (CeleBREX) 200 mg capsule    Vitamin D deficiency E55.9    Relevant Orders    Vitamin D 25-Hydroxy,Total (for eval of Vitamin D levels)    Vitamin B 12 deficiency E53.8    Relevant Orders    Vitamin B12    Acquired hypothyroidism E03.9    Relevant Medications    levothyroxine (Synthroid, Levoxyl) 25 mcg tablet    Other Relevant Orders    TSH with reflex to Free T4 if abnormal    Bilateral impacted cerumen H61.23    Relevant Orders    Ear Cerumen Removal     Other Visit Diagnoses         Codes    Abnormal glucose     R73.09    Relevant Orders    Hemoglobin A1C          F/U in 2 months for  Depression  Continue current medications  Call if any new concerns

## 2024-11-20 NOTE — PATIENT INSTRUCTIONS
F/U in 2 months for  Depression  Continue current medications  Call if any new concerns    Current Outpatient Medications   Medication Sig Dispense Refill    budesonide-formoteroL (Symbicort) 160-4.5 mcg/actuation inhaler Inhale 2 puffs 2 times a day. Rinse mouth with water after use to reduce aftertaste and incidence of candidiasis. Do not swallow. 10.2 g 3    cholecalciferol (Vitamin D-3) 25 MCG (1000 UT) capsule Take 1 capsule (25 mcg) by mouth once daily.      cyanocobalamin, vitamin B-12, 3,000 mcg capsule once every 24 hours.      albuterol (Ventolin HFA) 90 mcg/actuation inhaler Inhale 2 puffs every 4 hours if needed for wheezing or shortness of breath. 8 g 0    atorvastatin (Lipitor) 40 mg tablet Take 1 tablet (40 mg) by mouth once daily. 90 tablet 1    celecoxib (CeleBREX) 200 mg capsule Take 1 capsule (200 mg) by mouth once daily. 90 capsule 1    citalopram (CeleXA) 20 mg tablet Take 0.5 tablets (10 mg) by mouth once daily.      DULoxetine (Cymbalta) 30 mg DR capsule Take 1 capsule (30 mg) by mouth once daily. Do not crush or chew. 90 capsule 0    levothyroxine (Synthroid, Levoxyl) 25 mcg tablet Take 1 tablet (25 mcg) by mouth once daily in the morning. Take before meals. on an empty stomach 90 tablet 1    pantoprazole (ProtoNix) 40 mg EC tablet Take 1 tablet (40 mg) by mouth 2 times a day before meals. take before breakfast and dinner 180 tablet 1     No current facility-administered medications for this visit.     Fasting labs prior to next visit

## 2024-11-21 ENCOUNTER — APPOINTMENT (OUTPATIENT)
Dept: VASCULAR SURGERY | Facility: CLINIC | Age: 69
End: 2024-11-21
Payer: COMMERCIAL

## 2025-01-21 ENCOUNTER — APPOINTMENT (OUTPATIENT)
Dept: PRIMARY CARE | Facility: CLINIC | Age: 70
End: 2025-01-21
Payer: COMMERCIAL

## 2025-01-21 ENCOUNTER — TELEPHONE (OUTPATIENT)
Dept: PRIMARY CARE | Facility: CLINIC | Age: 70
End: 2025-01-21

## 2025-01-21 NOTE — TELEPHONE ENCOUNTER
Called pt back & she was already in the ER; she said she probably is dehydrated. She sounds totally exhausted.

## 2025-01-21 NOTE — TELEPHONE ENCOUNTER
Pt left a message saying that she has had norovirus since Fri. She has not vomited since yesterday, denies any loose stools. But she is still having fever & chills, T 100. She is asking if she needs to just wait it out or if you have any advice for her?

## 2025-01-26 DIAGNOSIS — K21.00 GASTROESOPHAGEAL REFLUX DISEASE WITH ESOPHAGITIS WITHOUT HEMORRHAGE: ICD-10-CM

## 2025-01-27 ENCOUNTER — PATIENT OUTREACH (OUTPATIENT)
Dept: PRIMARY CARE | Facility: CLINIC | Age: 70
End: 2025-01-27
Payer: COMMERCIAL

## 2025-01-27 RX ORDER — CEFDINIR 300 MG/1
300 CAPSULE ORAL 2 TIMES DAILY
COMMUNITY
Start: 2025-01-25 | End: 2025-02-04

## 2025-01-27 RX ORDER — DOXYCYCLINE 100 MG/1
100 CAPSULE ORAL 2 TIMES DAILY
COMMUNITY
Start: 2025-01-25 | End: 2025-01-28

## 2025-01-27 NOTE — PROGRESS NOTES
Discharge Facility: [facility]   Discharge Diagnosis: Nausea and vomiting, unspecified vomiting type; Bacteremia due to Gram-negative bacteria   Admission Date: 1/22/2025   Discharge Date: 1/25/2025     PCP Appointment Date: 2/3/2025  Specialist Appointment Date: [facility]  Hospital Encounter and Summary Linked: Yes  Transitional Care Management Enrollment with Kervin Saez RN (01/27/2025)   See discharge assessment below for further details  Engagement  Call Start Time: 1334 (1/27/2025  1:49 PM)    Medications  Medications reviewed with patient/caregiver?: Yes (new meds discussed with patient) (1/27/2025  1:49 PM)  Is the patient having any side effects they believe may be caused by any medication additions or changes?: No (1/27/2025  1:49 PM)  Does the patient have all medications ordered at discharge?: Yes (1/27/2025  1:49 PM)  Care Management Interventions: No intervention needed (1/27/2025  1:49 PM)  Prescription Comments: see med list (cefdinir; doxycycline) (1/27/2025  1:49 PM)  Is the patient taking all medications as directed (includes completed medication regime)?: Yes (1/27/2025  1:49 PM)    Appointments  Does the patient have a primary care provider?: Yes (1/27/2025  1:49 PM)  Care Management Interventions: Verified appointment date/time/provider (1/27/2025  1:49 PM)  Has the patient kept scheduled appointments due by today?: Yes (1/27/2025  1:49 PM)    Self Management  What is the home health agency?: Denies need (1/27/2025  1:49 PM)  What Durable Medical Equipment (DME) was ordered?: n/a (1/27/2025  1:49 PM)    Patient Teaching  Does the patient have access to their discharge instructions?: Yes (1/27/2025  1:49 PM)  Care Management Interventions: Reviewed instructions with patient (1/27/2025  1:49 PM)  What is the patient's perception of their health status since discharge?: Improving (1/27/2025  1:49 PM)  Is the patient/caregiver able to teach back the hierarchy of who to call/visit for  symptoms/problems? PCP, Specialist, Home Health nurse, Urgent Care, ED, 911: Yes (1/27/2025  1:49 PM)  Patient/Caregiver Education Comments: Successful outreach to the patient has been completed. The patient reports feeling well at home since discharge and denies experiencing any nausea/vomiting/abdominal pain but does still endorse she has a lack of appetite. States she is trying to supplement for now with Boost drinks until her appetite improves. We reviewed their new medications and any changes made. The patient confirmed that they have all the necessary supplies and resources at home, and they also have support from family and friends if needed. I emphasized the importance of follow-up appointments with both their primary care physician and specialists to assess treatment response. The patient is aware of my availability for non-emergency concerns and has been provided with my contact information. (1/27/2025  1:49 PM)

## 2025-01-28 RX ORDER — PANTOPRAZOLE SODIUM 40 MG/1
TABLET, DELAYED RELEASE ORAL
Qty: 180 TABLET | Refills: 1 | Status: SHIPPED | OUTPATIENT
Start: 2025-01-28

## 2025-01-31 NOTE — ADDENDUM NOTE
Addended by: RUTHY ERNANDEZ on: 9/27/2023 09:25 AM     Modules accepted: Orders     Patient:  Дмитрий Herrera Date:  2025   :  1958 Attending:  Serafin Pugh MD   66 year old male        GI CONSULT    Primary Outpatient GI physician: Dr. Petrona Castaneda  Consult Diagnosis:  ileus    Consult HPI  with Dr. Russ: 66-year-old male with past medical history not limited to type 2 diabetes, paroxysmal A-fib, and mitral valve replacement.  Patient presented to the hospital on  for CABG with possible aortic replacement.  GI consulted for abdominal pain and distention that improved with an NG tube.  KUB shows prominent small and large bowel loops consistent with an ileus.  Labs reveal elevated AST, stable hemoglobin, 9.2, slight thrombocytopenia. 2 Bowel movements noted on .     Patient notes symptoms started yesterday including nausea, abdominal discomfort, bloating. Patient notes significant improvements since NGT placed to decompress. Patient notes 1 BM yesterday and 2 today, but notes excess flatus. Patient ambulated in the [place] 3 times today. Patient denies melena or hematochezia, emesis, SOB, chest pain, fever. Patient denies family hx of colon cancer. Patient raised in a bar, notes they drink 15 beers about 4 days a week.     Endoscopic and pathology hx:   Colonoscopy 2020: per patient normal    Past Medical History:    DM (diabetes mellitus)  (CMD)                                 HTN (hypertension)                                            PAF (paroxysmal atrial fibrillation)  (CMD)                   ED (erectile dysfunction)                                     HLD (hyperlipidemia)                                          Leukopenia                                                    OA (osteoarthritis)                                           Hypertriglyceridemia                                          Wrist pain                                                    Wears glasses                                                   Comment: FOR DRIVING    Anesthesia complication                                          Comment: HYPOTHERMIA FOR A SHORT WHILE AFTER KNEE                SURGERY    Fracture                                        1970            Comment: right arm X 2    Allergy                                                       Wears reading eyeglasses                                      Past Surgical History:   Procedure Laterality Date    Ablation atrial fib - cv  02/18/2019    Bone marrow biopsy  06/22/2004, 5/9/2009    Carpal tunnel release Left 12/28/2012    Carpal tunnel release Right 06/07/2017    Carpal tunnel release Right 01/11/2023    Cdl cath poss ptca  01/20/2025    Colonoscopy w biopsy  07/08/2009    Echo heart limited  02/21/2019    Esophagogastroduodenoscopy  07/08/2009    with biopsy    Inguinal hernia repair Left     Joint replacement      Knee scope,diagnostic Left     x3    Ppm dual implant  12/21/2024    Total knee replacement Left 2008       Family History   Problem Relation Age of Onset    COPD Mother     Heart disease Mother     Diabetes Mother     Heart disease Father     Osteoarthritis Sister     Stroke Sister         drug induced    Alcohol Abuse Sister         Heroin abuse       ALLERGIES:   Allergen Reactions    Dog Dander Other (See Comments)       Social History:  Social History     Tobacco Use    Smoking status: Never    Smokeless tobacco: Never   Substance Use Topics    Alcohol use: Yes     Alcohol/week: 20.0 - 25.0 standard drinks of alcohol     Types: 20 - 25 Standard drinks or equivalent per week       Scheduled Inpatient Meds:  Current Facility-Administered Medications   Medication Dose Route Frequency Provider Last Rate Last Admin    pantoprazole (PROTONIX INJECT) injection 40 mg  40 mg Intravenous 2 times per day Jessica Gonsalez APNP        metoPROLOL (LOPRESSOR) injection 5 mg  5 mg Intravenous 4 times per day Jessica Gonsalez, APNP   5 mg at 01/31/25 1113    metoCLOPramide (REGLAN) injection 5 mg  5 mg Intravenous Q6H Jessica Gonsalez APNP         [START ON 2025] aspirin suppository 300 mg  300 mg Rectal Daily Jessica Gonsalez, APNP        thiamine (VITAMIN B1) tablet 100 mg  100 mg Oral Daily Jessica Gonsalez, APNP   100 mg at 25 1113    gabapentin (NEURONTIN) capsule 300 mg  300 mg Oral 3 times per day Jessica Gonsalez, APNP        bisacodyl (DULCOLAX) suppository 10 mg  10 mg Rectal Daily Jessica Gonsalez, APNP   10 mg at 25 1113    folic acid (FOLATE) tablet 1 mg  1 mg Oral Daily Jessica Gonsalez, APNP   1 mg at 25 1113    insulin glargine (LANTUS) injection 20 Units  20 Units Subcutaneous Daily Leeann Nixon MD        atorvastatin (LIPITOR) tablet 80 mg  80 mg Oral Daily Maria Del Carmen Rosas NP   80 mg at 25 0821    Potassium Standard Replacement Protocol (Levels 3.5 and lower)   Does not apply See Admin Instructions Maria Del Carmen Rosas NP        Potassium Replacement (Levels 3.6 - 4)   Does not apply See Admin Instructions Maria Del Carmen Rosas NP        docusate sodium-sennosides (SENOKOT S) 50-8.6 MG 2 tablet  2 tablet Oral Q12H Ranjana Herrera PA-C   2 tablet at 25 0822    polyethylene glycol (MIRALAX) packet 17 g  17 g Oral BID Ranjana Herrera PA-C   17 g at 25 2105    sertraline (ZOLOFT) tablet 100 mg  100 mg Oral Daily Cesar Corea PA-C   100 mg at 25 0821    acetaminophen (TYLENOL) tablet 650 mg  650 mg Oral 4 times per day Cesar Corea PA-C        [START ON 2025] sodium biphosphate (FLEET) enema  1 enema Rectal Once Cesar Corea PA-C        insulin lispro (ADMELOG, HumaLOG) injection 4 Units  4 Units Subcutaneous TID PC Cesar Corea PA-C         Medications Prior to Admission   Medication Sig Dispense Refill    [] amoxicillin-clavulanate (AUGMENTIN) 875-125 MG per tablet Take 1 tablet by mouth in the morning and 1 tablet in the evening. Do all this for 7 days. 14 tablet 0    dilTIAZem (DILT-XR) 120 MG 24 hr capsule Take 1 capsule by mouth daily. 30 capsule 1    lisinopril  (ZESTRIL) 20 MG tablet Take 1 tablet by mouth nightly. 180 tablet 3    lisinopril-hydroCHLOROthiazide (ZESTORETIC) 20-25 MG per tablet Take 1 tablet by mouth daily. 90 tablet 3    empagliflozin (Jardiance) 25 MG tablet Take 1 tablet by mouth daily (before breakfast). 90 tablet 0    atorvastatin (LIPITOR) 80 MG tablet Take 1 tablet by mouth daily. 90 tablet 3    fenofibrate (TRICOR) 145 MG tablet Take 1 tablet by mouth daily. 90 tablet 3    metformin (GLUCOPHAGE) 1000 MG tablet Take 1 tablet by mouth in the morning and 1 tablet in the evening. Take with meals. 180 tablet 3    sertraline (ZOLOFT) 100 MG tablet Take 1 tablet by mouth daily. 90 tablet 3    aspirin 81 MG tablet Take 1 tablet by mouth daily. 30 tablet 11        Review of Systems:  Per HPI    Physical Exam:   Vitals:  Visit Vitals  BP (!) 81/48   Pulse 90   Temp 97.6 °F (36.4 °C) (Oral)   Resp 18   Ht 6' 4\" (1.93 m)   Wt 106.4 kg (234 lb 9.1 oz)   SpO2 (!) 88%   BMI 28.55 kg/m²      General Appearance:  Alert, appears stated age and cooperative.  HEENT:  Normocephalic, atraumatic.  No scleral icterus.  Nares patent. NGT in place with bilious output  Neck:  supple, symmetrical, trachea midline.  Lungs:  Clear to auscultation bilaterally.  Heart:  Regular rate and rhythm. Bandage noted from cardiac surgery  Abdomen:  Soft, nontender, no guarding, BS+, distended.  No masses or organomegaly.  Rectal:  Deferred.  Genitourinary:  Deferred.  Extremities:  Extremities normal, atraumatic, no cyanosis or edema.  Skin:  Warm and dry without rash.  Neurologic:  Grossly normal, no focal deficits.     Labs:  Recent Labs   Lab 01/31/25  1112 01/31/25  0551 01/30/25  2044 01/30/25  0436 01/29/25  2259 01/29/25  1515   WBC  --  6.9 6.8 6.0  --  4.1*   HCT  --  26.2* 26.5* 27.3* 29.5* 30.6*   HGB  --  9.2* 9.2* 9.1* 9.9* 10.6*   MCV  --  95.3 95.3 96.5  --  93.6   PLT  --  115* 109* 122*  --  117*   INR  --   --   --  1.1  --  1.2   SODIUM  --  134*  --   --   --   --     POTASSIUM  --  4.1 4.2  --  3.8 4.1   GLUCOSE  --  176*  --   --   --   --    BUN  --  22*  --   --   --   --    CREATININE  --  1.21*  --   --   --   --    AST  --  41*  --   --   --   --    GPT  --  19  --   --   --   --    ALKPT  --  28*  --   --   --   --    BILIRUBIN  --  0.6  --   --   --   --    ALBUMIN  --  3.6  --   --   --   --    LACTA 1.9  --   --   --   --   --        Radiology Findings:  KUB 1/31:IMPRESSION: Unchanged air-filled prominent small and large bowel loops,  most consistent with mild nonspecific ileus.      Assessment:  #Ileus post surgical:  66-year-old male with past medical history not limited to type 2 diabetes, paroxysmal A-fib, and mitral valve replacement.  Patient presented to the hospital on 1/29 for CABG with possible aortic replacement.  GI consulted for abdominal pain and distention that improved with an NG tube.  KUB shows prominent small and large bowel loops consistent with an ileus.  Labs reveal elevated AST, stable hemoglobin, 9.2, slight thrombocytopenia. 2 Bowel movements noted on 1/31.     #abnormal CT AP from 9/2023: punctate calcification in the dome of the liver, hepatomegaly and hepatic steatosis. AST 2x>ALT, thrombocytopenia. Drinks 15 beers daily 4 days a week. Ddx: cirrhosis, other     Plans/Recommendations:    --Continue NGT on LIS, continuing to have bilious output  --Continue bowel regimen-dulcolax, senokot S, miralax BID.   --Continue NPO  --Repeat KUB in the morning, suspect results will show improvements if so trial clamping NGT and if tolerates then advance diet  --Ordered RUQ US, suspect cirrhosis  --Encourage alcohol cessation    Thank You,   Fely Conteh PA-C with Dr. Petrona Waller, is ready for discharge from my viewpoint: No  Workup needed prior to discharge:  imaging  Medications changes at discharge:   pending  Follow up appointments needed after discharge:   our office will contact patient    Estimated Date of Discharge documented:  2/4/2025    Patient seen and examined  Consulted for abdominal discomfort/ileus with KUB suggestive of small and large bowel dilation.  Patient currently has an NG tube to low intermittent suction with bilious output on stool regimen.  He is having bowel movements abdomen soft, nontender and mildly distended.  Keep n.p.o., aggressive replacement of electrolytes as needed, NG tube to intermittent suction, intermittent enemas/suppositories.  Repeat KUB tomorrow.  Incidental findings of cirrhosis based on imaging and blood work.  Further management/continued care of chronic liver disease  once acute issues have resolved as an outpatient.    Bo Andre MD  Associate/Advance GI Fellow  [facility]

## 2025-02-03 ENCOUNTER — APPOINTMENT (OUTPATIENT)
Dept: PRIMARY CARE | Facility: CLINIC | Age: 70
End: 2025-02-03
Payer: COMMERCIAL

## 2025-02-03 VITALS
DIASTOLIC BLOOD PRESSURE: 73 MMHG | HEART RATE: 86 BPM | HEIGHT: 67 IN | WEIGHT: 163 LBS | BODY MASS INDEX: 25.58 KG/M2 | OXYGEN SATURATION: 95 % | TEMPERATURE: 97.7 F | SYSTOLIC BLOOD PRESSURE: 112 MMHG

## 2025-02-03 DIAGNOSIS — J42 CHRONIC BRONCHITIS WITH WHEEZING (MULTI): ICD-10-CM

## 2025-02-03 DIAGNOSIS — A41.51 SEPSIS DUE TO ESCHERICHIA COLI WITHOUT ACUTE ORGAN DYSFUNCTION (MULTI): ICD-10-CM

## 2025-02-03 DIAGNOSIS — N39.0 UTI (URINARY TRACT INFECTION), BACTERIAL: Primary | ICD-10-CM

## 2025-02-03 DIAGNOSIS — A49.9 UTI (URINARY TRACT INFECTION), BACTERIAL: Primary | ICD-10-CM

## 2025-02-03 DIAGNOSIS — H61.23 BILATERAL IMPACTED CERUMEN: ICD-10-CM

## 2025-02-03 PROBLEM — C18.7 MALIGNANT NEOPLASM OF SIGMOID COLON (MULTI): Status: RESOLVED | Noted: 2024-03-18 | Resolved: 2025-02-03

## 2025-02-03 PROBLEM — T45.1X5A CINV (CHEMOTHERAPY-INDUCED NAUSEA AND VOMITING): Status: RESOLVED | Noted: 2024-07-09 | Resolved: 2025-02-03

## 2025-02-03 PROBLEM — M48.061 SPINAL STENOSIS, LUMBAR REGION, WITHOUT NEUROGENIC CLAUDICATION: Status: ACTIVE | Noted: 2024-12-06

## 2025-02-03 PROBLEM — R11.2 CINV (CHEMOTHERAPY-INDUCED NAUSEA AND VOMITING): Status: RESOLVED | Noted: 2024-07-09 | Resolved: 2025-02-03

## 2025-02-03 PROBLEM — K21.9 GASTROESOPHAGEAL REFLUX DISEASE WITHOUT ESOPHAGITIS: Status: ACTIVE | Noted: 2024-12-06

## 2025-02-03 PROCEDURE — 99496 TRANSJ CARE MGMT HIGH F2F 7D: CPT | Performed by: FAMILY MEDICINE

## 2025-02-03 PROCEDURE — 3008F BODY MASS INDEX DOCD: CPT | Performed by: FAMILY MEDICINE

## 2025-02-03 PROCEDURE — 1160F RVW MEDS BY RX/DR IN RCRD: CPT | Performed by: FAMILY MEDICINE

## 2025-02-03 PROCEDURE — 69209 REMOVE IMPACTED EAR WAX UNI: CPT | Performed by: FAMILY MEDICINE

## 2025-02-03 PROCEDURE — 1123F ACP DISCUSS/DSCN MKR DOCD: CPT | Performed by: FAMILY MEDICINE

## 2025-02-03 PROCEDURE — 1159F MED LIST DOCD IN RCRD: CPT | Performed by: FAMILY MEDICINE

## 2025-02-03 ASSESSMENT — ENCOUNTER SYMPTOMS
NERVOUS/ANXIOUS: 0
DIAPHORESIS: 0
POLYDIPSIA: 0
OCCASIONAL FEELINGS OF UNSTEADINESS: 0
PALPITATIONS: 0
VOMITING: 0
DIARRHEA: 0
ABDOMINAL PAIN: 0
POLYPHAGIA: 0
FEVER: 0
EYE REDNESS: 0
RHINORRHEA: 0
BACK PAIN: 0
WOUND: 0
FACIAL SWELLING: 0
LOSS OF SENSATION IN FEET: 0
ARTHRALGIAS: 0
SINUS PAIN: 0
EYE DISCHARGE: 0
NECK STIFFNESS: 0
FLANK PAIN: 0
DIZZINESS: 0
ADENOPATHY: 0
SORE THROAT: 0
JOINT SWELLING: 0
ACTIVITY CHANGE: 0
BRUISES/BLEEDS EASILY: 0
AGITATION: 0
EYE ITCHING: 0
CHILLS: 0
MYALGIAS: 0
UNEXPECTED WEIGHT CHANGE: 0
VOICE CHANGE: 0
SINUS PRESSURE: 0
SHORTNESS OF BREATH: 0
SLEEP DISTURBANCE: 0
TROUBLE SWALLOWING: 0
HEMATURIA: 0
EYE PAIN: 0
CHEST TIGHTNESS: 0
FREQUENCY: 0
DYSURIA: 0
CONSTIPATION: 0
COLOR CHANGE: 0
DEPRESSION: 0
BLOOD IN STOOL: 0
APPETITE CHANGE: 0
NAUSEA: 0
COUGH: 0
WHEEZING: 0
FATIGUE: 0

## 2025-02-03 ASSESSMENT — PATIENT HEALTH QUESTIONNAIRE - PHQ9
10. IF YOU CHECKED OFF ANY PROBLEMS, HOW DIFFICULT HAVE THESE PROBLEMS MADE IT FOR YOU TO DO YOUR WORK, TAKE CARE OF THINGS AT HOME, OR GET ALONG WITH OTHER PEOPLE: NOT DIFFICULT AT ALL
SUM OF ALL RESPONSES TO PHQ9 QUESTIONS 1 AND 2: 2
1. LITTLE INTEREST OR PLEASURE IN DOING THINGS: NOT AT ALL
2. FEELING DOWN, DEPRESSED OR HOPELESS: MORE THAN HALF THE DAYS

## 2025-02-03 NOTE — PROGRESS NOTES
"Patient: Tammy Marks  : 1955  PCP: Sonia Wiley MD  MRN: [id number]  Program: Transitional Care Management  Status: Enrolled  Effective Dates: 2025 - present  Responsible Staff: Kervin Saez RN  Social Drivers to be Addressed: No information to display         Tammy Marks is a 70 y.o. female presenting today for follow-up after being discharged from the hospital 7 days ago. The main problem requiring admission was urosepsis, nausea and vomiting. The discharge summary and/or Transitional Care Management documentation was reviewed. Medication reconciliation was performed as indicated via the \"Jose R as Reviewed\" timestamp.     Tammy Marks was contacted by Transitional Care Management services two days after her discharge. This encounter and supporting documentation was reviewed.  Has been tired and weak. She has had no appetite. Having things to drink and cold drinks. Today is feeling much better.  Review of Systems   Constitutional:  Negative for activity change, appetite change, chills, diaphoresis, fatigue, fever and unexpected weight change.   HENT:  Negative for congestion, dental problem, drooling, ear discharge, ear pain, facial swelling, hearing loss, nosebleeds, postnasal drip, rhinorrhea, sinus pressure, sinus pain, sneezing, sore throat, tinnitus, trouble swallowing and voice change.    Eyes:  Negative for pain, discharge, redness, itching and visual disturbance.   Respiratory:  Negative for cough, chest tightness, shortness of breath and wheezing.    Cardiovascular:  Negative for chest pain, palpitations and leg swelling.   Gastrointestinal:  Negative for abdominal pain, blood in stool, constipation, diarrhea, nausea and vomiting.   Endocrine: Negative for cold intolerance, heat intolerance, polydipsia, polyphagia and polyuria.   Genitourinary:  Negative for decreased urine volume, dysuria, flank pain, frequency, hematuria and urgency.   Musculoskeletal:  Negative for arthralgias, " "back pain, gait problem, joint swelling, myalgias and neck stiffness.   Skin:  Negative for color change, pallor, rash and wound.   Neurological:  Negative for dizziness.   Hematological:  Negative for adenopathy. Does not bruise/bleed easily.   Psychiatric/Behavioral:  Negative for agitation, behavioral problems and sleep disturbance. The patient is not nervous/anxious.      Patient ID: Tammy Marks is a 70 y.o. female.    Ear Cerumen Removal    Date/Time: 2/3/2025 3:21 PM    Performed by: Sonia Wiley MD  Authorized by: Sonia Wiley MD    Consent:     Consent obtained:  Verbal    Consent given by:  Patient    Risks, benefits, and alternatives were discussed: yes      Risks discussed:  Bleeding, infection, pain, TM perforation, incomplete removal and dizziness    Alternatives discussed:  No treatment, delayed treatment, alternative treatment, observation and referral  Universal protocol:     Procedure explained and questions answered to patient or proxy's satisfaction: yes      Patient identity confirmed:  Verbally with patient  Procedure details:     Location:  L ear and R ear    Procedure type: irrigation      Procedure outcomes: cerumen removed    Post-procedure details:     Inspection:  TM intact and ear canal clear    Hearing quality:  Normal    Procedure completion:  Tolerated well, no immediate complications    /73   Pulse 86   Temp 36.5 °C (97.7 °F) (Oral)   Ht 1.702 m (5' 7\")   Wt 73.9 kg (163 lb)   LMP  (LMP Unknown)   SpO2 95%   BMI 25.53 kg/m²     Physical Exam  Vitals and nursing note reviewed.   Constitutional:       General: She is not in acute distress.     Appearance: Normal appearance. She is normal weight. She is not ill-appearing, toxic-appearing or diaphoretic.   HENT:      Head: Normocephalic and atraumatic.      Right Ear: Tympanic membrane, ear canal and external ear normal. There is impacted cerumen.      Left Ear: Tympanic membrane, ear canal and external ear normal. " There is impacted cerumen.      Nose: Nose normal. No congestion or rhinorrhea.      Mouth/Throat:      Mouth: Mucous membranes are moist.      Pharynx: Oropharynx is clear. No oropharyngeal exudate or posterior oropharyngeal erythema.   Eyes:      General: No scleral icterus.        Right eye: No discharge.         Left eye: No discharge.      Extraocular Movements: Extraocular movements intact.      Conjunctiva/sclera: Conjunctivae normal.      Pupils: Pupils are equal, round, and reactive to light.   Neck:      Vascular: No carotid bruit.   Cardiovascular:      Rate and Rhythm: Normal rate and regular rhythm.      Pulses: Normal pulses.      Heart sounds: Normal heart sounds. No murmur heard.     No friction rub. No gallop.   Pulmonary:      Effort: Pulmonary effort is normal. No respiratory distress.      Breath sounds: Normal breath sounds. No stridor. No wheezing, rhonchi or rales.   Chest:      Chest wall: No tenderness.   Abdominal:      General: Abdomen is flat. Bowel sounds are normal. There is no distension.      Palpations: Abdomen is soft. There is no mass.      Tenderness: There is no abdominal tenderness. There is no right CVA tenderness, left CVA tenderness, guarding or rebound.      Hernia: No hernia is present.   Musculoskeletal:         General: No swelling, tenderness, deformity or signs of injury. Normal range of motion.      Cervical back: Normal range of motion and neck supple. No rigidity or tenderness.      Right lower leg: No edema.      Left lower leg: No edema.   Lymphadenopathy:      Cervical: No cervical adenopathy.   Skin:     General: Skin is warm and dry.      Capillary Refill: Capillary refill takes less than 2 seconds.      Coloration: Skin is not jaundiced or pale.      Findings: No bruising, erythema, lesion or rash.   Neurological:      General: No focal deficit present.      Mental Status: She is alert and oriented to person, place, and time. Mental status is at baseline.    Psychiatric:         Mood and Affect: Mood normal.         Behavior: Behavior normal.         Thought Content: Thought content normal.         Judgment: Judgment normal.         The complexity of medical decision making for this patient's transitional care is high.    Assessment/Plan   Problem List Items Addressed This Visit             ICD-10-CM    Chronic bronchitis with wheezing (Multi) J42     Other Visit Diagnoses         Codes    UTI (urinary tract infection), bacterial    -  Primary N39.0, A49.9    Sepsis due to Escherichia coli without acute organ dysfunction (Multi)     A41.51          Current Outpatient Medications   Medication Sig Dispense Refill    atorvastatin (Lipitor) 40 mg tablet Take 1 tablet (40 mg) by mouth once daily. 90 tablet 1    cefdinir (Omnicef) 300 mg capsule Take 1 capsule (300 mg) by mouth twice a day.      celecoxib (CeleBREX) 200 mg capsule Take 1 capsule (200 mg) by mouth once daily. 90 capsule 1    cholecalciferol (Vitamin D-3) 25 MCG (1000 UT) capsule Take 1 capsule (25 mcg) by mouth once daily.      citalopram (CeleXA) 20 mg tablet Take 0.5 tablets (10 mg) by mouth once daily.      DULoxetine (Cymbalta) 30 mg DR capsule Take 1 capsule (30 mg) by mouth once daily. Do not crush or chew. 90 capsule 0    levothyroxine (Synthroid, Levoxyl) 25 mcg tablet Take 1 tablet (25 mcg) by mouth once daily in the morning. Take before meals. on an empty stomach 90 tablet 1    pantoprazole (ProtoNix) 40 mg EC tablet TAKE ONE TABLET BY MOUTH TWICE A DAY BEFORE BREAKFAST AND DINNER 180 tablet 1    albuterol (Ventolin HFA) 90 mcg/actuation inhaler Inhale 2 puffs every 4 hours if needed for wheezing or shortness of breath. (Patient not taking: Reported on 2/3/2025) 8 g 0    budesonide-formoteroL (Symbicort) 160-4.5 mcg/actuation inhaler Inhale 2 puffs 2 times a day. Rinse mouth with water after use to reduce aftertaste and incidence of candidiasis. Do not swallow. (Patient not taking: Reported on  2/3/2025) 10.2 g 3    cyanocobalamin, vitamin B-12, 3,000 mcg capsule once every 24 hours. (Patient not taking: Reported on 2/3/2025)       No current facility-administered medications for this visit.     F/U in 1-2 months for Annual wellness and depression  Continue current medications  Call if any new concerns  Fasting labs prior to next visit  Reviewed consultation reports.  Immunizations revised  Labs reviewed  Hospital documents reviewed

## 2025-02-07 ENCOUNTER — PATIENT OUTREACH (OUTPATIENT)
Dept: PRIMARY CARE | Facility: CLINIC | Age: 70
End: 2025-02-07
Payer: COMMERCIAL

## 2025-02-12 ENCOUNTER — APPOINTMENT (OUTPATIENT)
Dept: PRIMARY CARE | Facility: CLINIC | Age: 70
End: 2025-02-12
Payer: COMMERCIAL

## 2025-02-12 VITALS
HEART RATE: 97 BPM | TEMPERATURE: 98.2 F | WEIGHT: 164.5 LBS | SYSTOLIC BLOOD PRESSURE: 111 MMHG | BODY MASS INDEX: 25.82 KG/M2 | DIASTOLIC BLOOD PRESSURE: 74 MMHG | OXYGEN SATURATION: 97 % | HEIGHT: 67 IN

## 2025-02-12 DIAGNOSIS — E03.9 ACQUIRED HYPOTHYROIDISM: ICD-10-CM

## 2025-02-12 DIAGNOSIS — F33.9 CHRONIC MAJOR DEPRESSIVE DISORDER, RECURRENT EPISODE (CMS-HCC): ICD-10-CM

## 2025-02-12 DIAGNOSIS — E78.2 MIXED HYPERLIPIDEMIA: Primary | ICD-10-CM

## 2025-02-12 PROBLEM — M19.012 ARTHRITIS OF LEFT SHOULDER REGION: Status: RESOLVED | Noted: 2023-02-11 | Resolved: 2025-02-12

## 2025-02-12 PROBLEM — D69.6 THROMBOCYTOPENIA (CMS-HCC): Status: RESOLVED | Noted: 2024-07-27 | Resolved: 2025-02-12

## 2025-02-12 PROBLEM — M77.8 RIGHT SHOULDER TENDINITIS: Status: RESOLVED | Noted: 2024-09-11 | Resolved: 2025-02-12

## 2025-02-12 PROBLEM — J42 CHRONIC BRONCHITIS WITH WHEEZING (MULTI): Status: RESOLVED | Noted: 2024-06-13 | Resolved: 2025-02-12

## 2025-02-12 LAB
25(OH)D3+25(OH)D2 SERPL-MCNC: 50 NG/ML (ref 30–100)
ALBUMIN SERPL-MCNC: 4.1 G/DL (ref 3.6–5.1)
ALP SERPL-CCNC: 125 U/L (ref 37–153)
ALT SERPL-CCNC: 14 U/L (ref 6–29)
ANION GAP SERPL CALCULATED.4IONS-SCNC: 9 MMOL/L (CALC) (ref 7–17)
AST SERPL-CCNC: 15 U/L (ref 10–35)
BASOPHILS # BLD AUTO: 49 CELLS/UL (ref 0–200)
BASOPHILS NFR BLD AUTO: 1 %
BILIRUB SERPL-MCNC: 0.7 MG/DL (ref 0.2–1.2)
BUN SERPL-MCNC: 15 MG/DL (ref 7–25)
CALCIUM SERPL-MCNC: 9.2 MG/DL (ref 8.6–10.4)
CHLORIDE SERPL-SCNC: 106 MMOL/L (ref 98–110)
CHOLEST SERPL-MCNC: 175 MG/DL
CHOLEST/HDLC SERPL: 3.5 (CALC)
CO2 SERPL-SCNC: 29 MMOL/L (ref 20–32)
CREAT SERPL-MCNC: 0.73 MG/DL (ref 0.6–1)
EGFRCR SERPLBLD CKD-EPI 2021: 88 ML/MIN/1.73M2
EOSINOPHIL # BLD AUTO: 211 CELLS/UL (ref 15–500)
EOSINOPHIL NFR BLD AUTO: 4.3 %
ERYTHROCYTE [DISTWIDTH] IN BLOOD BY AUTOMATED COUNT: 13.7 % (ref 11–15)
EST. AVERAGE GLUCOSE BLD GHB EST-MCNC: 126 MG/DL
EST. AVERAGE GLUCOSE BLD GHB EST-SCNC: 7 MMOL/L
GLUCOSE SERPL-MCNC: 98 MG/DL (ref 65–99)
HBA1C MFR BLD: 6 % OF TOTAL HGB
HCT VFR BLD AUTO: 38.3 % (ref 35–45)
HDLC SERPL-MCNC: 50 MG/DL
HGB BLD-MCNC: 12.8 G/DL (ref 11.7–15.5)
LDLC SERPL CALC-MCNC: 100 MG/DL (CALC)
LYMPHOCYTES # BLD AUTO: 1539 CELLS/UL (ref 850–3900)
LYMPHOCYTES NFR BLD AUTO: 31.4 %
MAGNESIUM SERPL-MCNC: 2.2 MG/DL (ref 1.5–2.5)
MCH RBC QN AUTO: 32.1 PG (ref 27–33)
MCHC RBC AUTO-ENTMCNC: 33.4 G/DL (ref 32–36)
MCV RBC AUTO: 96 FL (ref 80–100)
MONOCYTES # BLD AUTO: 382 CELLS/UL (ref 200–950)
MONOCYTES NFR BLD AUTO: 7.8 %
NEUTROPHILS # BLD AUTO: 2720 CELLS/UL (ref 1500–7800)
NEUTROPHILS NFR BLD AUTO: 55.5 %
NONHDLC SERPL-MCNC: 125 MG/DL (CALC)
PLATELET # BLD AUTO: 248 THOUSAND/UL (ref 140–400)
PMV BLD REES-ECKER: 9.9 FL (ref 7.5–12.5)
POTASSIUM SERPL-SCNC: 4.2 MMOL/L (ref 3.5–5.3)
PROT SERPL-MCNC: 6.8 G/DL (ref 6.1–8.1)
RBC # BLD AUTO: 3.99 MILLION/UL (ref 3.8–5.1)
SODIUM SERPL-SCNC: 144 MMOL/L (ref 135–146)
TRIGL SERPL-MCNC: 153 MG/DL
TSH SERPL-ACNC: 3.47 MIU/L (ref 0.4–4.5)
VIT B12 SERPL-MCNC: 1381 PG/ML (ref 200–1100)
WBC # BLD AUTO: 4.9 THOUSAND/UL (ref 3.8–10.8)

## 2025-02-12 PROCEDURE — 1036F TOBACCO NON-USER: CPT | Performed by: FAMILY MEDICINE

## 2025-02-12 PROCEDURE — 3008F BODY MASS INDEX DOCD: CPT | Performed by: FAMILY MEDICINE

## 2025-02-12 PROCEDURE — 1159F MED LIST DOCD IN RCRD: CPT | Performed by: FAMILY MEDICINE

## 2025-02-12 PROCEDURE — 1123F ACP DISCUSS/DSCN MKR DOCD: CPT | Performed by: FAMILY MEDICINE

## 2025-02-12 PROCEDURE — 99214 OFFICE O/P EST MOD 30 MIN: CPT | Performed by: FAMILY MEDICINE

## 2025-02-12 PROCEDURE — 1160F RVW MEDS BY RX/DR IN RCRD: CPT | Performed by: FAMILY MEDICINE

## 2025-02-12 PROCEDURE — G2211 COMPLEX E/M VISIT ADD ON: HCPCS | Performed by: FAMILY MEDICINE

## 2025-02-12 RX ORDER — CITALOPRAM 20 MG/1
10 TABLET, FILM COATED ORAL EVERY OTHER DAY
COMMUNITY
Start: 2025-02-12 | End: 2025-02-22

## 2025-02-12 RX ORDER — DULOXETIN HYDROCHLORIDE 30 MG/1
30 CAPSULE, DELAYED RELEASE ORAL DAILY
Qty: 90 CAPSULE | Refills: 0 | Status: SHIPPED | OUTPATIENT
Start: 2025-02-12

## 2025-02-12 ASSESSMENT — ENCOUNTER SYMPTOMS
RHINORRHEA: 0
AGITATION: 0
DIZZINESS: 0
APPETITE CHANGE: 0
EYE REDNESS: 0
DYSURIA: 0
ABDOMINAL PAIN: 0
MYALGIAS: 0
PALPITATIONS: 0
DEPRESSION: 0
DIARRHEA: 0
COLOR CHANGE: 0
BRUISES/BLEEDS EASILY: 0
POLYDIPSIA: 0
UNEXPECTED WEIGHT CHANGE: 0
CONSTIPATION: 0
HEMATURIA: 0
NERVOUS/ANXIOUS: 0
VOICE CHANGE: 0
COUGH: 0
SINUS PRESSURE: 0
NECK STIFFNESS: 0
ARTHRALGIAS: 0
FLANK PAIN: 0
WOUND: 0
EYE DISCHARGE: 0
SLEEP DISTURBANCE: 0
WHEEZING: 0
FEVER: 0
FATIGUE: 0
ADENOPATHY: 0
CHEST TIGHTNESS: 0
BACK PAIN: 0
CHILLS: 0
NAUSEA: 0
EYE ITCHING: 0
OCCASIONAL FEELINGS OF UNSTEADINESS: 0
SINUS PAIN: 0
LOSS OF SENSATION IN FEET: 0
EYE PAIN: 0
FACIAL SWELLING: 0
ACTIVITY CHANGE: 0
SHORTNESS OF BREATH: 0
DIAPHORESIS: 0
SORE THROAT: 0
JOINT SWELLING: 0
VOMITING: 0
BLOOD IN STOOL: 0
POLYPHAGIA: 0
FREQUENCY: 0
TROUBLE SWALLOWING: 0

## 2025-02-12 ASSESSMENT — PATIENT HEALTH QUESTIONNAIRE - PHQ9
1. LITTLE INTEREST OR PLEASURE IN DOING THINGS: NOT AT ALL
SUM OF ALL RESPONSES TO PHQ9 QUESTIONS 1 AND 2: 0
2. FEELING DOWN, DEPRESSED OR HOPELESS: NOT AT ALL

## 2025-02-12 NOTE — PATIENT INSTRUCTIONS
Wean off Celexa  Call if any side effects  Will F/U in 3 months for Medicare wellness and depression    Current Outpatient Medications   Medication Sig Dispense Refill    atorvastatin (Lipitor) 40 mg tablet Take 1 tablet (40 mg) by mouth once daily. 90 tablet 1    celecoxib (CeleBREX) 200 mg capsule Take 1 capsule (200 mg) by mouth once daily. 90 capsule 1    cholecalciferol (Vitamin D-3) 25 MCG (1000 UT) capsule Take 1 capsule (25 mcg) by mouth once daily.      levothyroxine (Synthroid, Levoxyl) 25 mcg tablet Take 1 tablet (25 mcg) by mouth once daily in the morning. Take before meals. on an empty stomach 90 tablet 1    pantoprazole (ProtoNix) 40 mg EC tablet TAKE ONE TABLET BY MOUTH TWICE A DAY BEFORE BREAKFAST AND DINNER 180 tablet 1    citalopram (CeleXA) 20 mg tablet Take 0.5 tablets (10 mg) by mouth every other day.      DULoxetine (Cymbalta) 30 mg DR capsule Take 1 capsule (30 mg) by mouth once daily. Do not crush or chew. 90 capsule 0     No current facility-administered medications for this visit.

## 2025-02-12 NOTE — PROGRESS NOTES
"Subjective   Patient ID: Tammy Marks is a 70 y.o. female who presents for Follow-up (Follow up & Discuss labs).  Today she is accompanied by alone.     HPI  Doing well on weaning and has no new concerns  A1c went up  Lipids are better  Review of Systems   Constitutional:  Negative for activity change, appetite change, chills, diaphoresis, fatigue, fever and unexpected weight change.   HENT:  Negative for congestion, dental problem, drooling, ear discharge, ear pain, facial swelling, hearing loss, nosebleeds, postnasal drip, rhinorrhea, sinus pressure, sinus pain, sneezing, sore throat, tinnitus, trouble swallowing and voice change.    Eyes:  Negative for pain, discharge, redness, itching and visual disturbance.   Respiratory:  Negative for cough, chest tightness, shortness of breath and wheezing.    Cardiovascular:  Negative for chest pain, palpitations and leg swelling.   Gastrointestinal:  Negative for abdominal pain, blood in stool, constipation, diarrhea, nausea and vomiting.   Endocrine: Negative for cold intolerance, heat intolerance, polydipsia, polyphagia and polyuria.   Genitourinary:  Negative for decreased urine volume, dysuria, flank pain, frequency, hematuria and urgency.   Musculoskeletal:  Negative for arthralgias, back pain, gait problem, joint swelling, myalgias and neck stiffness.   Skin:  Negative for color change, pallor, rash and wound.   Neurological:  Negative for dizziness.   Hematological:  Negative for adenopathy. Does not bruise/bleed easily.   Psychiatric/Behavioral:  Negative for agitation, behavioral problems and sleep disturbance. The patient is not nervous/anxious.        Objective   /74   Pulse 97   Temp 36.8 °C (98.2 °F) (Oral)   Ht 1.702 m (5' 7\")   Wt 74.6 kg (164 lb 8 oz)   LMP  (LMP Unknown)   SpO2 97%   BMI 25.76 kg/m²   BSA: 1.88 meters squared  Growth percentiles: Facility age limit for growth %angel is 20 years. Facility age limit for growth %angel is 20 " years.     Physical Exam  Vitals and nursing note reviewed.   Constitutional:       General: She is not in acute distress.     Appearance: Normal appearance. She is not ill-appearing, toxic-appearing or diaphoretic.   HENT:      Head: Normocephalic and atraumatic.      Right Ear: Tympanic membrane, ear canal and external ear normal. There is no impacted cerumen.      Left Ear: Tympanic membrane, ear canal and external ear normal. There is no impacted cerumen.      Nose: No congestion or rhinorrhea.      Mouth/Throat:      Mouth: Mucous membranes are moist.      Pharynx: Oropharynx is clear. No oropharyngeal exudate or posterior oropharyngeal erythema.   Eyes:      General: No scleral icterus.        Right eye: No discharge.         Left eye: No discharge.      Extraocular Movements: Extraocular movements intact.      Conjunctiva/sclera: Conjunctivae normal.      Pupils: Pupils are equal, round, and reactive to light.   Neck:      Vascular: No carotid bruit.   Cardiovascular:      Rate and Rhythm: Normal rate and regular rhythm.      Pulses: Normal pulses.      Heart sounds: Normal heart sounds. No murmur heard.     No friction rub. No gallop.   Pulmonary:      Effort: Pulmonary effort is normal. No respiratory distress.      Breath sounds: Normal breath sounds. No stridor. No wheezing, rhonchi or rales.   Chest:      Chest wall: No tenderness.   Musculoskeletal:         General: Normal range of motion.      Cervical back: Normal range of motion and neck supple. No rigidity or tenderness.      Right lower leg: No edema.      Left lower leg: No edema.   Lymphadenopathy:      Cervical: No cervical adenopathy.   Skin:     General: Skin is warm and dry.   Neurological:      General: No focal deficit present.      Mental Status: She is alert and oriented to person, place, and time.   Psychiatric:         Mood and Affect: Mood normal.         Behavior: Behavior normal.         Thought Content: Thought content normal.          Judgment: Judgment normal.         Assessment/Plan   Problem List Items Addressed This Visit             ICD-10-CM    Chronic major depressive disorder, recurrent episode (CMS-HCC) F33.9    Relevant Medications    DULoxetine (Cymbalta) 30 mg DR capsule    citalopram (CeleXA) 20 mg tablet       Lab Review  Orders Only on 02/11/2025   Component Date Value    CHOLESTEROL, TOTAL 02/11/2025 175     HDL CHOLESTEROL 02/11/2025 50     TRIGLYCERIDES 02/11/2025 153 (H)     LDL-CHOLESTEROL 02/11/2025 100 (H)     CHOL/HDLC RATIO 02/11/2025 3.5     NON HDL CHOLESTEROL 02/11/2025 125     MAGNESIUM 02/11/2025 2.2     GLUCOSE 02/11/2025 98     UREA NITROGEN (BUN) 02/11/2025 15     CREATININE 02/11/2025 0.73     EGFR 02/11/2025 88     SODIUM 02/11/2025 144     POTASSIUM 02/11/2025 4.2     CHLORIDE 02/11/2025 106     CARBON DIOXIDE 02/11/2025 29     ELECTROLYTE BALANCE 02/11/2025 9     CALCIUM 02/11/2025 9.2     PROTEIN, TOTAL 02/11/2025 6.8     ALBUMIN 02/11/2025 4.1     BILIRUBIN, TOTAL 02/11/2025 0.7     ALKALINE PHOSPHATASE 02/11/2025 125     AST 02/11/2025 15     ALT 02/11/2025 14     WHITE BLOOD CELL COUNT 02/11/2025 4.9     RED BLOOD CELL COUNT 02/11/2025 3.99     HEMOGLOBIN 02/11/2025 12.8     HEMATOCRIT 02/11/2025 38.3     MCV 02/11/2025 96.0     MCH 02/11/2025 32.1     MCHC 02/11/2025 33.4     RDW 02/11/2025 13.7     PLATELET COUNT 02/11/2025 248     MPV 02/11/2025 9.9     ABSOLUTE NEUTROPHILS 02/11/2025 2,720     ABSOLUTE LYMPHOCYTES 02/11/2025 1,539     ABSOLUTE MONOCYTES 02/11/2025 382     ABSOLUTE EOSINOPHILS 02/11/2025 211     ABSOLUTE BASOPHILS 02/11/2025 49     NEUTROPHILS 02/11/2025 55.5     LYMPHOCYTES 02/11/2025 31.4     MONOCYTES 02/11/2025 7.8     EOSINOPHILS 02/11/2025 4.3     BASOPHILS 02/11/2025 1.0     VITAMIN B12 02/11/2025 1,381 (H)     TSH W/REFLEX TO FT4 02/11/2025 3.47     VITAMIN D,25-OH,TOTAL,IA 02/11/2025 50     HEMOGLOBIN A1c 02/11/2025 6.0 (H)     eAG (mg/dL) 02/11/2025 126     eAG (mmol/L)  02/11/2025 7.0        Current Outpatient Medications   Medication Sig Dispense Refill    atorvastatin (Lipitor) 40 mg tablet Take 1 tablet (40 mg) by mouth once daily. 90 tablet 1    celecoxib (CeleBREX) 200 mg capsule Take 1 capsule (200 mg) by mouth once daily. 90 capsule 1    cholecalciferol (Vitamin D-3) 25 MCG (1000 UT) capsule Take 1 capsule (25 mcg) by mouth once daily.      levothyroxine (Synthroid, Levoxyl) 25 mcg tablet Take 1 tablet (25 mcg) by mouth once daily in the morning. Take before meals. on an empty stomach 90 tablet 1    pantoprazole (ProtoNix) 40 mg EC tablet TAKE ONE TABLET BY MOUTH TWICE A DAY BEFORE BREAKFAST AND DINNER 180 tablet 1    citalopram (CeleXA) 20 mg tablet Take 0.5 tablets (10 mg) by mouth every other day.      DULoxetine (Cymbalta) 30 mg DR capsule Take 1 capsule (30 mg) by mouth once daily. Do not crush or chew. 90 capsule 0     No current facility-administered medications for this visit.     Wean off Celexa  Call if any side effects  Will F/U in 3 months for Medicare wellness and depression

## 2025-03-04 ENCOUNTER — HOSPITAL ENCOUNTER (OUTPATIENT)
Dept: RADIOLOGY | Facility: CLINIC | Age: 70
Discharge: HOME | End: 2025-03-04
Payer: COMMERCIAL

## 2025-03-04 VITALS — BODY MASS INDEX: 25.74 KG/M2 | HEIGHT: 67 IN | WEIGHT: 164 LBS

## 2025-03-04 DIAGNOSIS — Z12.31 ENCOUNTER FOR SCREENING MAMMOGRAM FOR MALIGNANT NEOPLASM OF BREAST: ICD-10-CM

## 2025-03-04 DIAGNOSIS — M25.552 PAIN IN LEFT HIP: ICD-10-CM

## 2025-03-04 PROCEDURE — 77067 SCR MAMMO BI INCL CAD: CPT | Performed by: RADIOLOGY

## 2025-03-04 PROCEDURE — 77067 SCR MAMMO BI INCL CAD: CPT

## 2025-03-04 PROCEDURE — 77063 BREAST TOMOSYNTHESIS BI: CPT | Performed by: RADIOLOGY

## 2025-03-04 PROCEDURE — 73502 X-RAY EXAM HIP UNI 2-3 VIEWS: CPT | Mod: LT

## 2025-03-04 PROCEDURE — 73502 X-RAY EXAM HIP UNI 2-3 VIEWS: CPT | Mod: LEFT SIDE | Performed by: RADIOLOGY

## 2025-03-12 ENCOUNTER — PATIENT OUTREACH (OUTPATIENT)
Dept: PRIMARY CARE | Facility: CLINIC | Age: 70
End: 2025-03-12
Payer: COMMERCIAL

## 2025-04-17 ENCOUNTER — PATIENT OUTREACH (OUTPATIENT)
Dept: PRIMARY CARE | Facility: CLINIC | Age: 70
End: 2025-04-17
Payer: COMMERCIAL

## 2025-05-13 ENCOUNTER — HOSPITAL ENCOUNTER (OUTPATIENT)
Dept: RADIOLOGY | Facility: CLINIC | Age: 70
Discharge: HOME | End: 2025-05-13
Payer: COMMERCIAL

## 2025-05-13 ENCOUNTER — APPOINTMENT (OUTPATIENT)
Dept: PRIMARY CARE | Facility: CLINIC | Age: 70
End: 2025-05-13
Payer: COMMERCIAL

## 2025-05-13 VITALS
BODY MASS INDEX: 26.01 KG/M2 | SYSTOLIC BLOOD PRESSURE: 118 MMHG | WEIGHT: 165.7 LBS | HEART RATE: 90 BPM | OXYGEN SATURATION: 97 % | DIASTOLIC BLOOD PRESSURE: 81 MMHG | TEMPERATURE: 97.9 F | HEIGHT: 67 IN

## 2025-05-13 DIAGNOSIS — F33.9 CHRONIC MAJOR DEPRESSIVE DISORDER, RECURRENT EPISODE: ICD-10-CM

## 2025-05-13 DIAGNOSIS — M25.511 ACUTE PAIN OF RIGHT SHOULDER: ICD-10-CM

## 2025-05-13 DIAGNOSIS — R22.1 PULSATILE NECK MASS: ICD-10-CM

## 2025-05-13 DIAGNOSIS — F41.0 PANIC ATTACK DUE TO POST TRAUMATIC STRESS DISORDER (PTSD): ICD-10-CM

## 2025-05-13 DIAGNOSIS — M75.81 TENDINITIS OF RIGHT ROTATOR CUFF: ICD-10-CM

## 2025-05-13 DIAGNOSIS — Z00.00 ROUTINE GENERAL MEDICAL EXAMINATION AT HEALTH CARE FACILITY: Primary | ICD-10-CM

## 2025-05-13 DIAGNOSIS — Z78.0 ASYMPTOMATIC MENOPAUSAL STATE: ICD-10-CM

## 2025-05-13 DIAGNOSIS — F43.10 PANIC ATTACK DUE TO POST TRAUMATIC STRESS DISORDER (PTSD): ICD-10-CM

## 2025-05-13 DIAGNOSIS — E78.2 MIXED HYPERLIPIDEMIA: ICD-10-CM

## 2025-05-13 DIAGNOSIS — K21.00 GASTROESOPHAGEAL REFLUX DISEASE WITH ESOPHAGITIS WITHOUT HEMORRHAGE: ICD-10-CM

## 2025-05-13 DIAGNOSIS — M12.9 ARTHRITIS INVOLVING MULTIPLE SITES: ICD-10-CM

## 2025-05-13 DIAGNOSIS — R73.03 PREDIABETES: ICD-10-CM

## 2025-05-13 DIAGNOSIS — E03.9 ACQUIRED HYPOTHYROIDISM: ICD-10-CM

## 2025-05-13 DIAGNOSIS — Z71.89 CARDIAC RISK COUNSELING: ICD-10-CM

## 2025-05-13 LAB — POC HEMOGLOBIN A1C: 6.1 % (ref 4.2–6.5)

## 2025-05-13 PROCEDURE — 1159F MED LIST DOCD IN RCRD: CPT | Performed by: FAMILY MEDICINE

## 2025-05-13 PROCEDURE — 3008F BODY MASS INDEX DOCD: CPT | Performed by: FAMILY MEDICINE

## 2025-05-13 PROCEDURE — 1036F TOBACCO NON-USER: CPT | Performed by: FAMILY MEDICINE

## 2025-05-13 PROCEDURE — 1158F ADVNC CARE PLAN TLK DOCD: CPT | Performed by: FAMILY MEDICINE

## 2025-05-13 PROCEDURE — G0444 DEPRESSION SCREEN ANNUAL: HCPCS | Performed by: FAMILY MEDICINE

## 2025-05-13 PROCEDURE — 99397 PER PM REEVAL EST PAT 65+ YR: CPT | Performed by: FAMILY MEDICINE

## 2025-05-13 PROCEDURE — 73030 X-RAY EXAM OF SHOULDER: CPT | Mod: RT

## 2025-05-13 PROCEDURE — 1160F RVW MEDS BY RX/DR IN RCRD: CPT | Performed by: FAMILY MEDICINE

## 2025-05-13 PROCEDURE — G0446 INTENS BEHAVE THER CARDIO DX: HCPCS | Performed by: FAMILY MEDICINE

## 2025-05-13 PROCEDURE — G0439 PPPS, SUBSEQ VISIT: HCPCS | Performed by: FAMILY MEDICINE

## 2025-05-13 PROCEDURE — G0442 ANNUAL ALCOHOL SCREEN 15 MIN: HCPCS | Performed by: FAMILY MEDICINE

## 2025-05-13 PROCEDURE — 99497 ADVNCD CARE PLAN 30 MIN: CPT | Performed by: FAMILY MEDICINE

## 2025-05-13 PROCEDURE — 83036 HEMOGLOBIN GLYCOSYLATED A1C: CPT | Performed by: FAMILY MEDICINE

## 2025-05-13 PROCEDURE — 1170F FXNL STATUS ASSESSED: CPT | Performed by: FAMILY MEDICINE

## 2025-05-13 PROCEDURE — 99214 OFFICE O/P EST MOD 30 MIN: CPT | Performed by: FAMILY MEDICINE

## 2025-05-13 PROCEDURE — 73030 X-RAY EXAM OF SHOULDER: CPT | Mod: RIGHT SIDE | Performed by: RADIOLOGY

## 2025-05-13 RX ORDER — LEVOTHYROXINE SODIUM 25 UG/1
25 TABLET ORAL
Qty: 90 TABLET | Refills: 1 | Status: SHIPPED | OUTPATIENT
Start: 2025-05-13

## 2025-05-13 RX ORDER — ATORVASTATIN CALCIUM 40 MG/1
40 TABLET, FILM COATED ORAL DAILY
Qty: 90 TABLET | Refills: 1 | Status: SHIPPED | OUTPATIENT
Start: 2025-05-13

## 2025-05-13 RX ORDER — DULOXETIN HYDROCHLORIDE 60 MG/1
60 CAPSULE, DELAYED RELEASE ORAL DAILY
Qty: 90 CAPSULE | Refills: 0 | Status: SHIPPED | OUTPATIENT
Start: 2025-05-13

## 2025-05-13 RX ORDER — CYCLOBENZAPRINE HCL 5 MG
5 TABLET ORAL 2 TIMES DAILY PRN
Qty: 30 TABLET | Refills: 0 | Status: SHIPPED | OUTPATIENT
Start: 2025-05-13 | End: 2025-07-12

## 2025-05-13 RX ORDER — PANTOPRAZOLE SODIUM 40 MG/1
40 TABLET, DELAYED RELEASE ORAL
Qty: 180 TABLET | Refills: 1 | Status: SHIPPED | OUTPATIENT
Start: 2025-05-13

## 2025-05-13 RX ORDER — CELECOXIB 200 MG/1
200 CAPSULE ORAL DAILY
Qty: 90 CAPSULE | Refills: 1 | Status: SHIPPED | OUTPATIENT
Start: 2025-05-13

## 2025-05-13 SDOH — ECONOMIC STABILITY: GENERAL
WHICH OF THE FOLLOWING WOULD YOU LIKE TO GET CONNECTED TO IN ORDER TO RECEIVE A DISCOUNT OR FOR FREE? (CHOOSE ALL THAT APPLY): NO ASSISTANCE NEEDED

## 2025-05-13 SDOH — ECONOMIC STABILITY: GENERAL
WHICH OF THE FOLLOWING DO YOU KNOW HOW TO USE AND HAVE ACCESS TO EVERY DAY? (CHOOSE ALL THAT APPLY): DESKTOP COMPUTER, LAPTOP COMPUTER, OR TABLET WITH BROADBAND INTERNET CONNECTION;SMARTPHONE WITH CELLULAR DATA PLAN

## 2025-05-13 ASSESSMENT — MINI MENTAL STATE EXAM
WHAT STATE, COUNTRY, CITY, HOSPITAL, FLOOR: 5 CORRECT
SUM ALL MMSE QUESTIONS FOR TOTAL SCORE [OUT OF 30].: 30
HAND THE PERSON A PENCIL AND PAPER. SAY:  WRITE ANY COMPLETE SENTENCE ON THAT PIECE OF PAPER. (NOTE: THE SENTENCE MUST MAKE SENSE.  IGNORE SPELLING ERRORS): 1 CORRECT
PLEASE COPY THIS PICTURE (NOTE ALL 10 ANGLES MUST BE PRESENT AND TWO MUST INTERSECT): 1 CORRECT
PLACE DESIGN, ERASER AND PENCIL IN FRONT OF THE PERSON.  SAY:  COPY THIS DESIGN PLEASE.  SHOW: DESIGN. ALLOW: MULTIPLE TRIES. WAIT UNTIL PERSON IS FINISHED AND HANDS IT BACK. SCORE: ONLY FOR DIAGRAM WITH 4-SIDED FIGURE BETWEEN TWO 5-SIDED FIGURES: 1 CORRECT
NAME OR REPEAT 3 OBJECTS - (APPLE, TABLE, PENNY) OR (BALL, TREE, FLAG): 3 CORRECT
SAY: I WOULD LIKE YOU TO REPEAT THIS PHRASE AFTER ME: NO IFS, ANDS, OR BUTS.: 1 CORRECT
SAY:  READ THE WORDS ON THE PAGE AND THEN DO WHAT IT SAYS.  THEN HAND THE PERSON THE SHEET WITH CLOSE YOUR EYES ON IT.  IF THE SUBJECT READS AND DOES NOT CLOSE THEIR EYES, REPEAT UP TO THREE TIMES.  SCORE ONLY IF SUBJECT CLOSES EYES.: 3 CORRECT
SPELL THE WORD WORLD FORWARD AND BACKWARDS OR SERIAL 7S: 5 CORRECT
RECALL THE 3 OBJECTS FROM ABOVE (APPLE, TABLE, PENNY) OR (BALL, TREE, FLAG): 3 CORRECT
WHAT IS THE YEAR, SEASON, DATE, DAY, AND MONTH: 5 CORRECT
SHOW: PENCIL [OBJECT] ASK: WHAT IS THIS CALLED?: 2 CORRECT

## 2025-05-13 ASSESSMENT — ACTIVITIES OF DAILY LIVING (ADL)
MANAGING_FINANCES: INDEPENDENT
TAKING_MEDICATION: INDEPENDENT
GROCERY_SHOPPING: INDEPENDENT
DOING_HOUSEWORK: INDEPENDENT
BATHING: INDEPENDENT
DRESSING: INDEPENDENT

## 2025-05-13 ASSESSMENT — LIFESTYLE VARIABLES
SKIP TO QUESTIONS 9-10: 1
HOW MANY STANDARD DRINKS CONTAINING ALCOHOL DO YOU HAVE ON A TYPICAL DAY: 1 OR 2
HOW OFTEN DO YOU HAVE A DRINK CONTAINING ALCOHOL: 4 OR MORE TIMES A WEEK
AUDIT-C TOTAL SCORE: 4
HOW OFTEN DO YOU HAVE SIX OR MORE DRINKS ON ONE OCCASION: NEVER

## 2025-05-13 ASSESSMENT — PATIENT HEALTH QUESTIONNAIRE - PHQ9
1. LITTLE INTEREST OR PLEASURE IN DOING THINGS: SEVERAL DAYS
2. FEELING DOWN, DEPRESSED OR HOPELESS: SEVERAL DAYS
SUM OF ALL RESPONSES TO PHQ9 QUESTIONS 1 AND 2: 2
10. IF YOU CHECKED OFF ANY PROBLEMS, HOW DIFFICULT HAVE THESE PROBLEMS MADE IT FOR YOU TO DO YOUR WORK, TAKE CARE OF THINGS AT HOME, OR GET ALONG WITH OTHER PEOPLE: SOMEWHAT DIFFICULT

## 2025-05-13 ASSESSMENT — ENCOUNTER SYMPTOMS
LOSS OF SENSATION IN FEET: 0
OCCASIONAL FEELINGS OF UNSTEADINESS: 0
DEPRESSION: 0

## 2025-05-13 ASSESSMENT — ANXIETY QUESTIONNAIRES
2. NOT BEING ABLE TO STOP OR CONTROL WORRYING: NEARLY EVERY DAY
1. FEELING NERVOUS, ANXIOUS, OR ON EDGE: NEARLY EVERY DAY
5. BEING SO RESTLESS THAT IT IS HARD TO SIT STILL: NEARLY EVERY DAY
3. WORRYING TOO MUCH ABOUT DIFFERENT THINGS: NEARLY EVERY DAY
6. BECOMING EASILY ANNOYED OR IRRITABLE: NEARLY EVERY DAY
GAD7 TOTAL SCORE: 21
4. TROUBLE RELAXING: NEARLY EVERY DAY
IF YOU CHECKED OFF ANY PROBLEMS ON THIS QUESTIONNAIRE, HOW DIFFICULT HAVE THESE PROBLEMS MADE IT FOR YOU TO DO YOUR WORK, TAKE CARE OF THINGS AT HOME, OR GET ALONG WITH OTHER PEOPLE: NOT DIFFICULT AT ALL
7. FEELING AFRAID AS IF SOMETHING AWFUL MIGHT HAPPEN: NEARLY EVERY DAY

## 2025-05-13 ASSESSMENT — SOCIAL DETERMINANTS OF HEALTH (SDOH)
WITHIN THE LAST YEAR, HAVE TO BEEN RAPED OR FORCED TO HAVE ANY KIND OF SEXUAL ACTIVITY BY YOUR PARTNER OR EX-PARTNER?: NO
WITHIN THE LAST YEAR, HAVE YOU BEEN AFRAID OF YOUR PARTNER OR EX-PARTNER?: NO
WITHIN THE LAST YEAR, HAVE YOU BEEN HUMILIATED OR EMOTIONALLY ABUSED IN OTHER WAYS BY YOUR PARTNER OR EX-PARTNER?: NO
HOW HARD IS IT FOR YOU TO PAY FOR THE VERY BASICS LIKE FOOD, HOUSING, MEDICAL CARE, AND HEATING?: NOT HARD AT ALL
WITHIN THE LAST YEAR, HAVE YOU BEEN KICKED, HIT, SLAPPED, OR OTHERWISE PHYSICALLY HURT BY YOUR PARTNER OR EX-PARTNER?: NO

## 2025-05-13 NOTE — ASSESSMENT & PLAN NOTE
Stable on current medications  Continue meds and exercise.   Has increased stress with all the new health issues   Orders:    DULoxetine (Cymbalta) 60 mg DR capsule; Take 1 capsule (60 mg) by mouth once daily. Do not crush or chew.

## 2025-05-13 NOTE — ASSESSMENT & PLAN NOTE
Stable   Will adjust based on labs  Orders:    levothyroxine (Synthroid, Levoxyl) 25 mcg tablet; Take 1 tablet (25 mcg) by mouth once daily in the morning. Take before meals. on an empty stomach

## 2025-05-13 NOTE — ASSESSMENT & PLAN NOTE
Orders:    cyclobenzaprine (Flexeril) 5 mg tablet; Take 1 tablet (5 mg) by mouth 2 times a day as needed for muscle spasms.    XR shoulder right 2+ views; Future    Referral to Physical Therapy; Future

## 2025-05-13 NOTE — ASSESSMENT & PLAN NOTE
Stable. Continue current medications  Orders:    pantoprazole (ProtoNix) 40 mg EC tablet; Take 1 tablet (40 mg) by mouth 2 times a day before meals. Do not crush, chew, or split.
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
## 2025-05-14 ENCOUNTER — SOCIAL WORK (OUTPATIENT)
Dept: PRIMARY CARE | Facility: CLINIC | Age: 70
End: 2025-05-14
Payer: COMMERCIAL

## 2025-05-14 DIAGNOSIS — F41.0 PANIC ATTACK DUE TO POST TRAUMATIC STRESS DISORDER (PTSD): ICD-10-CM

## 2025-05-14 DIAGNOSIS — F43.10 PANIC ATTACK DUE TO POST TRAUMATIC STRESS DISORDER (PTSD): ICD-10-CM
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
## 2025-05-16 ENCOUNTER — DOCUMENTATION (OUTPATIENT)
Dept: BEHAVIORAL HEALTH | Facility: CLINIC | Age: 70
End: 2025-05-16

## 2025-05-16 ENCOUNTER — EVALUATION (OUTPATIENT)
Dept: PHYSICAL THERAPY | Facility: CLINIC | Age: 70
End: 2025-05-16
Payer: COMMERCIAL

## 2025-05-16 DIAGNOSIS — M75.81 TENDINITIS OF RIGHT ROTATOR CUFF: Primary | ICD-10-CM

## 2025-05-16 DIAGNOSIS — M25.511 RIGHT SHOULDER PAIN: ICD-10-CM

## 2025-05-16 PROCEDURE — 97162 PT EVAL MOD COMPLEX 30 MIN: CPT | Performed by: PHYSICAL THERAPIST

## 2025-05-16 PROCEDURE — 97140 MANUAL THERAPY 1/> REGIONS: CPT | Performed by: PHYSICAL THERAPIST

## 2025-05-16 ASSESSMENT — PATIENT HEALTH QUESTIONNAIRE - PHQ9
2. FEELING DOWN, DEPRESSED OR HOPELESS: NEARLY EVERY DAY
10. IF YOU CHECKED OFF ANY PROBLEMS, HOW DIFFICULT HAVE THESE PROBLEMS MADE IT FOR YOU TO DO YOUR WORK, TAKE CARE OF THINGS AT HOME, OR GET ALONG WITH OTHER PEOPLE: SOMEWHAT DIFFICULT
6. FEELING BAD ABOUT YOURSELF - OR THAT YOU ARE A FAILURE OR HAVE LET YOURSELF OR YOUR FAMILY DOWN: MORE THAN HALF THE DAYS
5. POOR APPETITE OR OVEREATING: NEARLY EVERY DAY
7. TROUBLE CONCENTRATING ON THINGS, SUCH AS READING THE NEWSPAPER OR WATCHING TELEVISION: MORE THAN HALF THE DAYS
1. LITTLE INTEREST OR PLEASURE IN DOING THINGS: NEARLY EVERY DAY
SUM OF ALL RESPONSES TO PHQ9 QUESTIONS 1 & 2: 6
4. FEELING TIRED OR HAVING LITTLE ENERGY: NEARLY EVERY DAY
9. THOUGHTS THAT YOU WOULD BE BETTER OFF DEAD, OR OF HURTING YOURSELF: NOT AT ALL
8. MOVING OR SPEAKING SO SLOWLY THAT OTHER PEOPLE COULD HAVE NOTICED. OR THE OPPOSITE, BEING SO FIGETY OR RESTLESS THAT YOU HAVE BEEN MOVING AROUND A LOT MORE THAN USUAL: MORE THAN HALF THE DAYS
SUM OF ALL RESPONSES TO PHQ QUESTIONS 1-9: 21
3. TROUBLE FALLING OR STAYING ASLEEP: NEARLY EVERY DAY

## 2025-05-16 ASSESSMENT — ANXIETY QUESTIONNAIRES
6. BECOMING EASILY ANNOYED OR IRRITABLE: NEARLY EVERY DAY
GAD7 TOTAL SCORE: 20
7. FEELING AFRAID AS IF SOMETHING AWFUL MIGHT HAPPEN: NEARLY EVERY DAY
4. TROUBLE RELAXING: NEARLY EVERY DAY
2. NOT BEING ABLE TO STOP OR CONTROL WORRYING: NEARLY EVERY DAY
5. BEING SO RESTLESS THAT IT IS HARD TO SIT STILL: MORE THAN HALF THE DAYS
1. FEELING NERVOUS, ANXIOUS, OR ON EDGE: NEARLY EVERY DAY
IF YOU CHECKED OFF ANY PROBLEMS ON THIS QUESTIONNAIRE, HOW DIFFICULT HAVE THESE PROBLEMS MADE IT FOR YOU TO DO YOUR WORK, TAKE CARE OF THINGS AT HOME, OR GET ALONG WITH OTHER PEOPLE: SOMEWHAT DIFFICULT
3. WORRYING TOO MUCH ABOUT DIFFERENT THINGS: NEARLY EVERY DAY
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
## 2025-05-16 NOTE — PROGRESS NOTES
Physical Therapy     Physical Therapy Evaluation and Treatment      Patient Name: Tammy Marks 1955   Encounter Diagnoses   Name Primary?    Tendinitis of right rotator cuff Yes    Right shoulder pain         THERAPY VISIT NUMBER:  1    Today's Date: 5-16-25    REFERRING DR. FCO STILL     SUBJECTIVE:        PAINFUL RIGHT SHOULDER--AT BICEPS AND RTC AREA--ANTERIOR SHOULDER---7/10 PAIN---I CANT LIFT MY ARM VERY WELL WITHOUT PAIN-SHARP     GOALS: DECREASE SHOULDER PAIN     OBJECTIVE:PAINFUL LIFTING INTO FLEXION---80 FLEXION---KNOT AT MID BICEPS     ASSESSMENT: DEBILITY WITH ALL RIGHT UPPER EXTEMITY USE    PLAN AND TREATMENT:  SEE FLOW SHEET PROGRAM IN CHART:    1 X WEEKLY

## 2025-05-22 ENCOUNTER — APPOINTMENT (OUTPATIENT)
Dept: PRIMARY CARE | Facility: CLINIC | Age: 70
End: 2025-05-22
Payer: COMMERCIAL

## 2025-05-22 ENCOUNTER — APPOINTMENT (OUTPATIENT)
Dept: PHYSICAL THERAPY | Facility: CLINIC | Age: 70
End: 2025-05-22
Payer: COMMERCIAL

## 2025-05-22 DIAGNOSIS — M25.511 RIGHT SHOULDER PAIN: Primary | ICD-10-CM

## 2025-05-22 PROCEDURE — 97140 MANUAL THERAPY 1/> REGIONS: CPT | Performed by: PHYSICAL THERAPIST

## 2025-05-22 PROCEDURE — 97035 APP MDLTY 1+ULTRASOUND EA 15: CPT | Performed by: PHYSICAL THERAPIST

## 2025-05-22 PROCEDURE — 97110 THERAPEUTIC EXERCISES: CPT | Performed by: PHYSICAL THERAPIST

## 2025-05-22 NOTE — PROGRESS NOTES
Physical Therapy     Physical Therapy Evaluation and Treatment      Patient Name:Tammy Marks 1955   Encounter Diagnosis   Name Primary?    Right shoulder pain Yes        THERAPY VISIT NUMBER: 2    Today's Date: 5-22-25    REFERRING DR. cohn     SUBJECTIVE:        painful shoulder/biceps--is getting better     GOALS:   CONTINUE TO AROM     OBJECTIVE: SEE FLOW SHEET--     ASSESSMENT: SEE FLOW SHEET    PLAN AND TREATMENT:  SEE FLOW SHEET PROGRAM IN CHART:    1 X WEEKLY

## 2025-05-22 NOTE — PROGRESS NOTES
Collaborative Care (CoCM)  Progress Note    Type of Interaction: In Office    Start Time: 1:08 pm     End Time: 2:00 pm        Appointment: Scheduled    Reason for Visit:   Chief Complaint   Patient presents with    Depression    Meeting with patient for follow up in office  Feeling calmer  Son now has custody of kids- wife needs to get help and can have supervised visits  Did go out to with family, cousin is moving out of state  Son and kids temporarily staying with patient and  while he gets their new living situation sorted  Self care schedules  Feeling a little less jumpy  Routines  PT for rotator cuff      Interval History / Patient Symptoms:     Patient Health Questionnaire-9 Score: 21 (5/16/2025 12:42 PM)  FALLON-7 Total Score: 20 (5/16/2025 12:50 PM)        Interventions Provided: Review Progress/Goals Stress Management, Mindfulness, and person centered, support      Progress Made: Mixed    Response to Intervention: open and engaging         Plan: follow up in 2 weeks     Follow Up / Next Appointment: Next appointment: 06/04/25

## 2025-05-30 ENCOUNTER — DOCUMENTATION (OUTPATIENT)
Dept: PRIMARY CARE | Facility: CLINIC | Age: 70
End: 2025-05-30

## 2025-05-30 ENCOUNTER — TREATMENT (OUTPATIENT)
Dept: PHYSICAL THERAPY | Facility: CLINIC | Age: 70
End: 2025-05-30
Payer: COMMERCIAL

## 2025-05-30 DIAGNOSIS — M75.81 TENDINITIS OF RIGHT ROTATOR CUFF: Primary | ICD-10-CM

## 2025-05-30 DIAGNOSIS — F33.9 CHRONIC MAJOR DEPRESSIVE DISORDER, RECURRENT EPISODE: Primary | ICD-10-CM

## 2025-05-30 PROCEDURE — 97035 APP MDLTY 1+ULTRASOUND EA 15: CPT | Performed by: PHYSICAL THERAPIST

## 2025-05-30 PROCEDURE — 97140 MANUAL THERAPY 1/> REGIONS: CPT | Performed by: PHYSICAL THERAPIST

## 2025-05-30 PROCEDURE — 97110 THERAPEUTIC EXERCISES: CPT | Performed by: PHYSICAL THERAPIST

## 2025-05-30 NOTE — PROGRESS NOTES
Physical Therapy     Physical Therapy Evaluation and Treatment      Patient Name: Tammy Marks 1955   Encounter Diagnosis   Name Primary?    Tendinitis of right rotator cuff Yes        THERAPY VISIT NUMBER: 3    Today's Date: 5-30-25    REFERRING DR. STILL     SUBJECTIVE:       PAINFUL BICEPS --BUT I THINK IST 50 PERCENT BETTER      GOALS:   DECREASE PAINFUL RIGHT SHOULDER     OBJECTIVE:  SEE FLOW SHEET     ASSESSMENT: MAKING PROGRESS    PLAN AND TREATMENT:  SEE FLOW SHEET PROGRAM IN CHART:    1 X WEEKLY

## 2025-06-04 ENCOUNTER — APPOINTMENT (OUTPATIENT)
Dept: PRIMARY CARE | Facility: CLINIC | Age: 70
End: 2025-06-04
Payer: COMMERCIAL

## 2025-06-09 ENCOUNTER — HOSPITAL ENCOUNTER (OUTPATIENT)
Dept: RADIOLOGY | Facility: CLINIC | Age: 70
End: 2025-06-09
Payer: COMMERCIAL

## 2025-06-09 DIAGNOSIS — Z78.0 ASYMPTOMATIC MENOPAUSAL STATE: ICD-10-CM

## 2025-06-09 PROCEDURE — 77080 DXA BONE DENSITY AXIAL: CPT

## 2025-06-09 PROCEDURE — 77080 DXA BONE DENSITY AXIAL: CPT | Performed by: RADIOLOGY

## 2025-06-10 ENCOUNTER — TELEPHONE (OUTPATIENT)
Dept: PRIMARY CARE | Facility: CLINIC | Age: 70
End: 2025-06-10
Payer: COMMERCIAL

## 2025-06-10 DIAGNOSIS — R22.1 PULSATILE NECK MASS: Primary | ICD-10-CM

## 2025-06-11 ENCOUNTER — HOSPITAL ENCOUNTER (OUTPATIENT)
Dept: RADIOLOGY | Facility: CLINIC | Age: 70
Discharge: HOME | End: 2025-06-11
Payer: COMMERCIAL

## 2025-06-11 ENCOUNTER — APPOINTMENT (OUTPATIENT)
Dept: RADIOLOGY | Facility: CLINIC | Age: 70
End: 2025-06-11
Payer: COMMERCIAL

## 2025-06-11 DIAGNOSIS — R22.1 PULSATILE NECK MASS: ICD-10-CM

## 2025-06-11 PROCEDURE — 93880 EXTRACRANIAL BILAT STUDY: CPT | Performed by: RADIOLOGY

## 2025-06-11 PROCEDURE — 93880 EXTRACRANIAL BILAT STUDY: CPT

## 2025-06-13 ENCOUNTER — APPOINTMENT (OUTPATIENT)
Dept: PHYSICAL THERAPY | Facility: CLINIC | Age: 70
End: 2025-06-13
Payer: COMMERCIAL

## 2025-06-13 DIAGNOSIS — M75.81 TENDINITIS OF RIGHT ROTATOR CUFF: Primary | ICD-10-CM

## 2025-06-13 NOTE — PROGRESS NOTES
Physical Therapy     Physical Therapy Evaluation and Treatment      Patient Name: Tammy Marks 1955   Encounter Diagnosis   Name Primary?    Tendinitis of right rotator cuff Yes        THERAPY VISIT NUMBER: 4    Today's Date: 6-13-25    REFERRING DR. Sonia cohn     SUBJECTIVE:        painful shoulder is doing better     GOALS: doing better     OBJECTIVE: painful shoulder is 30 percent better     ASSESSMENT: debility    PLAN AND TREATMENT:  SEE FLOW SHEET PROGRAM IN CHART:    1 x weekly

## 2025-06-16 ENCOUNTER — TELEPHONE (OUTPATIENT)
Dept: PRIMARY CARE | Facility: CLINIC | Age: 70
End: 2025-06-16
Payer: COMMERCIAL

## 2025-06-16 NOTE — PROGRESS NOTES
Patient cancelled follow up appointment 2 weeks and has no rescheduled. Left message today with patient to check in to see how things have been going and left number to call back.

## 2025-06-27 ENCOUNTER — TELEPHONE (OUTPATIENT)
Dept: PRIMARY CARE | Facility: CLINIC | Age: 70
End: 2025-06-27

## 2025-06-27 ENCOUNTER — APPOINTMENT (OUTPATIENT)
Dept: PHYSICAL THERAPY | Facility: CLINIC | Age: 70
End: 2025-06-27
Payer: COMMERCIAL

## 2025-06-27 NOTE — TELEPHONE ENCOUNTER
Pt returned my call & helped me answer the questions. Faxed forms along w/ last office note to [facility]

## 2025-06-27 NOTE — TELEPHONE ENCOUNTER
MARLONTCB regarding forms from [facility]. She is trying to get custody of her 2 grandchildren & I just need her to help me answer a few questions.

## 2025-07-09 ENCOUNTER — OFFICE VISIT (OUTPATIENT)
Dept: PRIMARY CARE | Facility: CLINIC | Age: 70
End: 2025-07-09
Payer: COMMERCIAL

## 2025-07-09 VITALS
SYSTOLIC BLOOD PRESSURE: 132 MMHG | DIASTOLIC BLOOD PRESSURE: 80 MMHG | WEIGHT: 167 LBS | TEMPERATURE: 97.1 F | BODY MASS INDEX: 26.16 KG/M2 | OXYGEN SATURATION: 98 % | HEART RATE: 80 BPM

## 2025-07-09 DIAGNOSIS — R50.9 FEVER, UNSPECIFIED FEVER CAUSE: Primary | ICD-10-CM

## 2025-07-09 PROCEDURE — 1159F MED LIST DOCD IN RCRD: CPT | Performed by: FAMILY MEDICINE

## 2025-07-09 PROCEDURE — 99214 OFFICE O/P EST MOD 30 MIN: CPT | Performed by: FAMILY MEDICINE

## 2025-07-09 PROCEDURE — 1036F TOBACCO NON-USER: CPT | Performed by: FAMILY MEDICINE

## 2025-07-09 ASSESSMENT — ENCOUNTER SYMPTOMS
MYALGIAS: 1
BACK PAIN: 0
HEADACHES: 0
PALPITATIONS: 0
CHEST TIGHTNESS: 0
LIGHT-HEADEDNESS: 0
DIZZINESS: 0
CHILLS: 1
FEVER: 1
APPETITE CHANGE: 0
CHOKING: 0
FATIGUE: 0
ARTHRALGIAS: 0
FACIAL ASYMMETRY: 0
COLOR CHANGE: 0
COUGH: 0
ACTIVITY CHANGE: 0

## 2025-07-09 NOTE — PROGRESS NOTES
"Subjective   Patient ID: Tammy Marks \"Tammy Strange" is a 70 y.o. female who presents for Low grade fever, chills, body aches.  (X 2 days. ).    HPI   Reports she has had fever and chills for 2 days reports she does not have a COVID test to take.  Has low-grade fever chills body aches for 2 days.    Monday was achy, fever, chills. She went to bed. Did have some heartburn. She did take her pantoprazole and some tums. Next day worse symptoms also had a headache. She took some tylenol and went to bed.     Review of Systems   Constitutional:  Positive for chills and fever. Negative for activity change, appetite change and fatigue.   HENT:  Negative for congestion.    Respiratory:  Negative for cough, choking and chest tightness.    Cardiovascular:  Negative for chest pain, palpitations and leg swelling.   Musculoskeletal:  Positive for myalgias. Negative for arthralgias, back pain and gait problem.   Skin:  Negative for color change and pallor.   Neurological:  Negative for dizziness, facial asymmetry, light-headedness and headaches.       Objective   /80 (BP Location: Left arm, Patient Position: Sitting)   Pulse 80   Temp 36.2 °C (97.1 °F)   Wt 75.8 kg (167 lb)   LMP  (LMP Unknown)   SpO2 98%   BMI 26.16 kg/m²   BSA Body surface area is 1.89 meters squared.      Physical Exam  Constitutional:       General: She is not in acute distress.     Appearance: Normal appearance. She is not toxic-appearing.   HENT:      Head: Normocephalic.      Right Ear: Tympanic membrane, ear canal and external ear normal.      Left Ear: Tympanic membrane, ear canal and external ear normal.      Nose: Nose normal.      Mouth/Throat:      Pharynx: Oropharynx is clear.   Eyes:      Conjunctiva/sclera: Conjunctivae normal.      Pupils: Pupils are equal, round, and reactive to light.   Cardiovascular:      Rate and Rhythm: Normal rate and regular rhythm.      Pulses: Normal pulses.      Heart sounds: Normal heart sounds. "   Pulmonary:      Effort: No respiratory distress.      Breath sounds: No wheezing, rhonchi or rales.   Abdominal:      General: Bowel sounds are normal. There is no distension.      Palpations: Abdomen is soft.      Tenderness: There is no abdominal tenderness.   Musculoskeletal:         General: No swelling or tenderness.      Cervical back: No tenderness.   Skin:     Findings: No lesion or rash.   Neurological:      General: No focal deficit present.      Mental Status: She is alert and oriented to person, place, and time. Mental status is at baseline.      Gait: Gait normal.   Psychiatric:         Mood and Affect: Mood normal.         Behavior: Behavior normal.         Thought Content: Thought content normal.         Judgment: Judgment normal.       Office Visit on 05/13/2025   Component Date Value Ref Range Status    POC HEMOGLOBIN A1c 05/13/2025 6.1  4.2 - 6.5 % Final   Orders Only on 02/11/2025   Component Date Value Ref Range Status    CHOLESTEROL, TOTAL 02/11/2025 175  <200 mg/dL Final    HDL CHOLESTEROL 02/11/2025 50  > OR = 50 mg/dL Final    TRIGLYCERIDES 02/11/2025 153 (H)  <150 mg/dL Final    LDL-CHOLESTEROL 02/11/2025 100 (H)  mg/dL (calc) Final    Comment: Reference range: <100     Desirable range <100 mg/dL for primary prevention;    <70 mg/dL for patients with CHD or diabetic patients   with > or = 2 CHD risk factors.     LDL-C is now calculated using the Rommel-Gerda   calculation, which is a validated novel method providing   better accuracy than the Friedewald equation in the   estimation of LDL-C.   Rommel NEVAREZ et al. RED. 2013;310(19): 5795-9636   (http://education.[employer].RotaryView/faq/SPP216)      CHOL/HDLC RATIO 02/11/2025 3.5  <5.0 (calc) Final    NON HDL CHOLESTEROL 02/11/2025 125  <130 mg/dL (calc) Final    Comment: For patients with diabetes plus 1 major ASCVD risk   factor, treating to a non-HDL-C goal of <100 mg/dL   (LDL-C of <70 mg/dL) is considered a therapeutic   option.       MAGNESIUM 02/11/2025 2.2  1.5 - 2.5 mg/dL Final    GLUCOSE 02/11/2025 98  65 - 99 mg/dL Final    Comment:               Fasting reference interval         UREA NITROGEN (BUN) 02/11/2025 15  7 - 25 mg/dL Final    CREATININE 02/11/2025 0.73  0.60 - 1.00 mg/dL Final    EGFR 02/11/2025 88  > OR = 60 mL/min/1.73m2 Final    SODIUM 02/11/2025 144  135 - 146 mmol/L Final    POTASSIUM 02/11/2025 4.2  3.5 - 5.3 mmol/L Final    CHLORIDE 02/11/2025 106  98 - 110 mmol/L Final    CARBON DIOXIDE 02/11/2025 29  20 - 32 mmol/L Final    ELECTROLYTE BALANCE 02/11/2025 9  7 - 17 mmol/L (calc) Final    CALCIUM 02/11/2025 9.2  8.6 - 10.4 mg/dL Final    PROTEIN, TOTAL 02/11/2025 6.8  6.1 - 8.1 g/dL Final    ALBUMIN 02/11/2025 4.1  3.6 - 5.1 g/dL Final    BILIRUBIN, TOTAL 02/11/2025 0.7  0.2 - 1.2 mg/dL Final    ALKALINE PHOSPHATASE 02/11/2025 125  37 - 153 U/L Final    AST 02/11/2025 15  10 - 35 U/L Final    ALT 02/11/2025 14  6 - 29 U/L Final    WHITE BLOOD CELL COUNT 02/11/2025 4.9  3.8 - 10.8 Thousand/uL Final    RED BLOOD CELL COUNT 02/11/2025 3.99  3.80 - 5.10 Million/uL Final    HEMOGLOBIN 02/11/2025 12.8  11.7 - 15.5 g/dL Final    HEMATOCRIT 02/11/2025 38.3  35.0 - 45.0 % Final    MCV 02/11/2025 96.0  80.0 - 100.0 fL Final    MCH 02/11/2025 32.1  27.0 - 33.0 pg Final    MCHC 02/11/2025 33.4  32.0 - 36.0 g/dL Final    Comment: For adults, a slight decrease in the calculated MCHC  value (in the range of 30 to 32 g/dL) is most likely  not clinically significant; however, it should be  interpreted with caution in correlation with other  red cell parameters and the patient's clinical  condition.      RDW 02/11/2025 13.7  11.0 - 15.0 % Final    PLATELET COUNT 02/11/2025 248  140 - 400 Thousand/uL Final    MPV 02/11/2025 9.9  7.5 - 12.5 fL Final    ABSOLUTE NEUTROPHILS 02/11/2025 2,720  1,500 - 7,800 cells/uL Final    ABSOLUTE LYMPHOCYTES 02/11/2025 1,539  850 - 3,900 cells/uL Final    ABSOLUTE MONOCYTES 02/11/2025 382  200 - 950 cells/uL  Final    ABSOLUTE EOSINOPHILS 02/11/2025 211  15 - 500 cells/uL Final    ABSOLUTE BASOPHILS 02/11/2025 49  0 - 200 cells/uL Final    NEUTROPHILS 02/11/2025 55.5  % Final    LYMPHOCYTES 02/11/2025 31.4  % Final    MONOCYTES 02/11/2025 7.8  % Final    EOSINOPHILS 02/11/2025 4.3  % Final    BASOPHILS 02/11/2025 1.0  % Final    VITAMIN B12 02/11/2025 1,381 (H)  200 - 1,100 pg/mL Final    TSH W/REFLEX TO FT4 02/11/2025 3.47  0.40 - 4.50 mIU/L Final    VITAMIN D,25-OH,TOTAL,IA 02/11/2025 50  30 - 100 ng/mL Final    Comment: Vitamin D Status         25-OH Vitamin D:     Deficiency:                    <20 ng/mL  Insufficiency:             20 - 29 ng/mL  Optimal:                 > or = 30 ng/mL     For 25-OH Vitamin D testing on patients on   D2-supplementation and patients for whom quantitation   of D2 and D3 fractions is required, the [employer]()  25-OH VIT D, (D2,D3), LC/MS/MS is recommended: order   code 71234 (patients >2yrs).     See Note 1     Note 1     For additional information, please refer to   http://education.[employer]/faq/ZVK687   (This link is being provided for informational/  educational purposes only.)      HEMOGLOBIN A1c 02/11/2025 6.0 (H)  <5.7 % of total Hgb Final    Comment: For someone without known diabetes, a hemoglobin   A1c value between 5.7% and 6.4% is consistent with  prediabetes and should be confirmed with a   follow-up test.     For someone with known diabetes, a value <7%  indicates that their diabetes is well controlled. A1c  targets should be individualized based on duration of  diabetes, age, comorbid conditions, and other  considerations.     This assay result is consistent with an increased risk  of diabetes.     Currently, no consensus exists regarding use of  hemoglobin A1c for diagnosis of diabetes for children.         eAG (mg/dL) 02/11/2025 126  mg/dL Final    eAG (mmol/L) 02/11/2025 7.0  mmol/L Final     Medications Ordered Prior to Encounter[1]  No images are  attached to the encounter.            Assessment/Plan   Diagnoses and all orders for this visit:  Fever, unspecified fever cause  -     CBC and Auto Differential; Future  -     Comprehensive metabolic panel; Future  -     CBC and Auto Differential; Future  -     Comprehensive Metabolic Panel; Future  -     Richard-Barr Virus Antibody Panel; Future  -     Cytomegalovirus Antibody, IgM; Future  -     Cytomegalovirus IgG; Future  -     Blood Culture    1.  Patient to continue to have supportive care  2.  Patient to have additional labwork performed  2.  Patient to call for questions or concerns            [1]   Current Outpatient Medications on File Prior to Visit   Medication Sig Dispense Refill    atorvastatin (Lipitor) 40 mg tablet Take 1 tablet (40 mg) by mouth once daily. 90 tablet 1    celecoxib (CeleBREX) 200 mg capsule Take 1 capsule (200 mg) by mouth once daily. 90 capsule 1    cholecalciferol (Vitamin D-3) 25 MCG (1000 UT) capsule Take 1 capsule (1,000 Units) by mouth once daily.      cyclobenzaprine (Flexeril) 5 mg tablet Take 1 tablet (5 mg) by mouth 2 times a day as needed for muscle spasms. 30 tablet 0    DULoxetine (Cymbalta) 60 mg DR capsule Take 1 capsule (60 mg) by mouth once daily. Do not crush or chew. 90 capsule 0    levothyroxine (Synthroid, Levoxyl) 25 mcg tablet Take 1 tablet (25 mcg) by mouth once daily in the morning. Take before meals. on an empty stomach 90 tablet 1    pantoprazole (ProtoNix) 40 mg EC tablet Take 1 tablet (40 mg) by mouth 2 times a day before meals. Do not crush, chew, or split. 180 tablet 1     No current facility-administered medications on file prior to visit.

## 2025-07-10 LAB
ALBUMIN SERPL-MCNC: 4.4 G/DL (ref 3.6–5.1)
ALP SERPL-CCNC: 126 U/L (ref 37–153)
ALT SERPL-CCNC: 66 U/L (ref 6–29)
ANION GAP SERPL CALCULATED.4IONS-SCNC: 12 MMOL/L (CALC) (ref 7–17)
AST SERPL-CCNC: 53 U/L (ref 10–35)
BACTERIA BLD CULT: NORMAL
BASOPHILS # BLD AUTO: 26 CELLS/UL (ref 0–200)
BASOPHILS NFR BLD AUTO: 0.3 %
BILIRUB SERPL-MCNC: 0.8 MG/DL (ref 0.2–1.2)
BUN SERPL-MCNC: 13 MG/DL (ref 7–25)
CALCIUM SERPL-MCNC: 9.3 MG/DL (ref 8.6–10.4)
CHLORIDE SERPL-SCNC: 105 MMOL/L (ref 98–110)
CO2 SERPL-SCNC: 24 MMOL/L (ref 20–32)
CREAT SERPL-MCNC: 0.83 MG/DL (ref 0.6–1)
EGFRCR SERPLBLD CKD-EPI 2021: 76 ML/MIN/1.73M2
EOSINOPHIL # BLD AUTO: 88 CELLS/UL (ref 15–500)
EOSINOPHIL NFR BLD AUTO: 1 %
ERYTHROCYTE [DISTWIDTH] IN BLOOD BY AUTOMATED COUNT: 12.5 % (ref 11–15)
GLUCOSE SERPL-MCNC: 109 MG/DL (ref 65–139)
HCT VFR BLD AUTO: 41.6 % (ref 35–45)
HGB BLD-MCNC: 13.9 G/DL (ref 11.7–15.5)
LYMPHOCYTES # BLD AUTO: 1232 CELLS/UL (ref 850–3900)
LYMPHOCYTES NFR BLD AUTO: 14 %
MCH RBC QN AUTO: 31.7 PG (ref 27–33)
MCHC RBC AUTO-ENTMCNC: 33.4 G/DL (ref 32–36)
MCV RBC AUTO: 94.8 FL (ref 80–100)
MONOCYTES # BLD AUTO: 924 CELLS/UL (ref 200–950)
MONOCYTES NFR BLD AUTO: 10.5 %
NEUTROPHILS # BLD AUTO: 6530 CELLS/UL (ref 1500–7800)
NEUTROPHILS NFR BLD AUTO: 74.2 %
PLATELET # BLD AUTO: 215 THOUSAND/UL (ref 140–400)
PMV BLD REES-ECKER: 10.2 FL (ref 7.5–12.5)
POTASSIUM SERPL-SCNC: 3.8 MMOL/L (ref 3.5–5.3)
PROT SERPL-MCNC: 6.5 G/DL (ref 6.1–8.1)
QUEST FLEXITEST2 RESULTS:: NORMAL
RBC # BLD AUTO: 4.39 MILLION/UL (ref 3.8–5.1)
SODIUM SERPL-SCNC: 141 MMOL/L (ref 135–146)
WBC # BLD AUTO: 8.8 THOUSAND/UL (ref 3.8–10.8)

## 2025-07-11 LAB
CMV IGG SERPL IA-ACNC: <0.6 U/ML
CMV IGM SERPL IA-ACNC: <30 AU/ML
EBV NA IGG SER IA-ACNC: 122 U/ML
EBV VCA IGG SER IA-ACNC: 723 U/ML
EBV VCA IGM SER IA-ACNC: <36 U/ML
QUEST EBV PANEL INTERPRETATION:: ABNORMAL

## 2025-07-15 LAB — BACTERIA BLD CULT: NORMAL

## 2025-07-23 ENCOUNTER — APPOINTMENT (OUTPATIENT)
Dept: PRIMARY CARE | Facility: CLINIC | Age: 70
End: 2025-07-23
Payer: COMMERCIAL

## 2025-08-12 ENCOUNTER — DOCUMENTATION (OUTPATIENT)
Dept: PHYSICAL THERAPY | Facility: CLINIC | Age: 70
End: 2025-08-12
Payer: COMMERCIAL

## 2025-08-12 DIAGNOSIS — M75.81 TENDINITIS OF RIGHT ROTATOR CUFF: Primary | ICD-10-CM

## 2025-08-14 ENCOUNTER — APPOINTMENT (OUTPATIENT)
Dept: PRIMARY CARE | Facility: CLINIC | Age: 70
End: 2025-08-14
Payer: COMMERCIAL

## 2025-08-14 VITALS
OXYGEN SATURATION: 95 % | DIASTOLIC BLOOD PRESSURE: 79 MMHG | HEART RATE: 98 BPM | BODY MASS INDEX: 26.37 KG/M2 | SYSTOLIC BLOOD PRESSURE: 129 MMHG | HEIGHT: 67 IN | TEMPERATURE: 97.6 F | WEIGHT: 168 LBS

## 2025-08-14 DIAGNOSIS — J01.00 ACUTE NON-RECURRENT MAXILLARY SINUSITIS: Primary | ICD-10-CM

## 2025-08-14 DIAGNOSIS — R73.03 PREDIABETES: ICD-10-CM

## 2025-08-14 DIAGNOSIS — M15.0 PRIMARY OSTEOARTHRITIS INVOLVING MULTIPLE JOINTS: ICD-10-CM

## 2025-08-14 DIAGNOSIS — R79.89 ELEVATED LFTS: ICD-10-CM

## 2025-08-14 DIAGNOSIS — R05.1 ACUTE COUGH: ICD-10-CM

## 2025-08-14 DIAGNOSIS — E53.8 VITAMIN B 12 DEFICIENCY: ICD-10-CM

## 2025-08-14 DIAGNOSIS — E78.2 MIXED HYPERLIPIDEMIA: ICD-10-CM

## 2025-08-14 DIAGNOSIS — R09.81 SINUS CONGESTION: ICD-10-CM

## 2025-08-14 DIAGNOSIS — E03.9 ACQUIRED HYPOTHYROIDISM: ICD-10-CM

## 2025-08-14 PROBLEM — M25.559 HIP PAIN: Status: ACTIVE | Noted: 2025-08-14

## 2025-08-15 DIAGNOSIS — F33.9 CHRONIC MAJOR DEPRESSIVE DISORDER, RECURRENT EPISODE: ICD-10-CM

## 2025-08-16 RX ORDER — DULOXETIN HYDROCHLORIDE 60 MG/1
60 CAPSULE, DELAYED RELEASE ORAL DAILY
Qty: 90 CAPSULE | Refills: 0 | Status: SHIPPED | OUTPATIENT
Start: 2025-08-16

## 2025-11-18 ENCOUNTER — APPOINTMENT (OUTPATIENT)
Dept: PRIMARY CARE | Facility: CLINIC | Age: 70
End: 2025-11-18
Payer: COMMERCIAL